# Patient Record
Sex: FEMALE | Race: WHITE | URBAN - METROPOLITAN AREA
[De-identification: names, ages, dates, MRNs, and addresses within clinical notes are randomized per-mention and may not be internally consistent; named-entity substitution may affect disease eponyms.]

---

## 2017-09-05 ENCOUNTER — EMERGENCY (EMERGENCY)
Facility: HOSPITAL | Age: 62
LOS: 1 days | Discharge: HOME | End: 2017-09-05
Attending: EMERGENCY MEDICINE | Admitting: EMERGENCY MEDICINE
Payer: MEDICAID

## 2017-09-05 VITALS
RESPIRATION RATE: 20 BRPM | HEART RATE: 60 BPM | OXYGEN SATURATION: 96 % | SYSTOLIC BLOOD PRESSURE: 170 MMHG | TEMPERATURE: 98 F | DIASTOLIC BLOOD PRESSURE: 86 MMHG

## 2017-09-05 VITALS
RESPIRATION RATE: 16 BRPM | HEART RATE: 95 BPM | OXYGEN SATURATION: 95 % | SYSTOLIC BLOOD PRESSURE: 141 MMHG | DIASTOLIC BLOOD PRESSURE: 84 MMHG

## 2017-09-05 LAB
BASOPHILS # BLD AUTO: 0.06 K/UL — SIGNIFICANT CHANGE UP (ref 0–0.2)
BASOPHILS NFR BLD AUTO: 0.6 % — SIGNIFICANT CHANGE UP (ref 0–2)
CK MB BLD-MCNC: 2.3 NG/ML — SIGNIFICANT CHANGE UP (ref 1–4.7)
CK SERPL-CCNC: 129 U/L — SIGNIFICANT CHANGE UP (ref 25–170)
EOSINOPHIL # BLD AUTO: 0.43 K/UL — SIGNIFICANT CHANGE UP (ref 0–0.5)
EOSINOPHIL NFR BLD AUTO: 4.6 % — SIGNIFICANT CHANGE UP (ref 0–6)
HCT VFR BLD CALC: 38.2 % — SIGNIFICANT CHANGE UP (ref 34.5–45)
HGB BLD-MCNC: 12 G/DL — SIGNIFICANT CHANGE UP (ref 11.5–15.5)
IMM GRANULOCYTES # BLD AUTO: 0.02 # — SIGNIFICANT CHANGE UP
IMM GRANULOCYTES NFR BLD AUTO: 0.2 % — SIGNIFICANT CHANGE UP (ref 0–1.5)
LYMPHOCYTES # BLD AUTO: 2.65 K/UL — SIGNIFICANT CHANGE UP (ref 1–3.3)
LYMPHOCYTES # BLD AUTO: 28.3 % — SIGNIFICANT CHANGE UP (ref 13–44)
MCHC RBC-ENTMCNC: 28.9 PG — SIGNIFICANT CHANGE UP (ref 27–34)
MCHC RBC-ENTMCNC: 31.4 % — LOW (ref 32–36)
MCV RBC AUTO: 92 FL — SIGNIFICANT CHANGE UP (ref 80–100)
MONOCYTES # BLD AUTO: 0.7 K/UL — SIGNIFICANT CHANGE UP (ref 0–0.9)
MONOCYTES NFR BLD AUTO: 7.5 % — SIGNIFICANT CHANGE UP (ref 2–14)
NEUTROPHILS # BLD AUTO: 5.52 K/UL — SIGNIFICANT CHANGE UP (ref 1.8–7.4)
NEUTROPHILS NFR BLD AUTO: 58.8 % — SIGNIFICANT CHANGE UP (ref 43–77)
NRBC # FLD: 0 — SIGNIFICANT CHANGE UP
PLATELET # BLD AUTO: 237 K/UL — SIGNIFICANT CHANGE UP (ref 150–400)
PMV BLD: 10.4 FL — SIGNIFICANT CHANGE UP (ref 7–13)
RBC # BLD: 4.15 M/UL — SIGNIFICANT CHANGE UP (ref 3.8–5.2)
RBC # FLD: 13.5 % — SIGNIFICANT CHANGE UP (ref 10.3–14.5)
TROPONIN T SERPL-MCNC: < 0.06 NG/ML — SIGNIFICANT CHANGE UP (ref 0–0.06)
WBC # BLD: 9.38 K/UL — SIGNIFICANT CHANGE UP (ref 3.8–10.5)
WBC # FLD AUTO: 9.38 K/UL — SIGNIFICANT CHANGE UP (ref 3.8–10.5)

## 2017-09-05 PROCEDURE — 99285 EMERGENCY DEPT VISIT HI MDM: CPT | Mod: 25

## 2017-09-05 PROCEDURE — 71010: CPT | Mod: 26

## 2017-09-05 PROCEDURE — 93010 ELECTROCARDIOGRAM REPORT: CPT

## 2017-09-05 RX ORDER — SODIUM CHLORIDE 9 MG/ML
3 INJECTION INTRAMUSCULAR; INTRAVENOUS; SUBCUTANEOUS ONCE
Qty: 0 | Refills: 0 | Status: COMPLETED | OUTPATIENT
Start: 2017-09-05 | End: 2017-09-05

## 2017-09-05 RX ADMIN — SODIUM CHLORIDE 3 MILLILITER(S): 9 INJECTION INTRAMUSCULAR; INTRAVENOUS; SUBCUTANEOUS at 04:52

## 2017-09-05 NOTE — ED PROVIDER NOTE - OBJECTIVE STATEMENT
63yo f pmh depression, HTN, COPD, reflux, p/w chest pain. Pain since 11pm. Mild associated SOB. No associated diaphoresis. Pain is sternal. Pt was given ASA by EMS + nitro w/o relief. Still with pain in ED. Mild cough at baseline w/o fevers or chills.    Per NH pt has had multiple ED visits for same at [facility] with negative w/u.

## 2017-09-05 NOTE — ED PROVIDER NOTE - ATTENDING CONTRIBUTION TO CARE
63 yo with CP and overall ill feeling.  CV and resp exam in the ED are unremarkable.  Discussed with the NH and they report that the patient has a history of pain and these type of episodes when she gets upset with staff or is not getting enough attention.  Has been worked up multiple times per report at [facility] with negative findings.  [facility]  Not consistent with an ACS.  EKG not concerning.  Will dc back to NH

## 2017-09-05 NOTE — ED ADULT TRIAGE NOTE - CHIEF COMPLAINT QUOTE
Patient c/o chest pain and pressure starting 11pm. Patient was given 162 ASA and 1NTG SL with no relief. Patient reports hx. COPD, Hyperlipidemia. Patient received with 22G IV Right hand by EMS.

## 2017-09-05 NOTE — ED ADULT NURSE NOTE - OBJECTIVE STATEMENT
pt. A&Ox3 c/o chest pain since 10 pm last night . Pt. states to have a hx. HLD and HTN , appears comfortable and in NAD. Pt. states she comes from a rehab center , because she is unable to take care of herself.  Pt. placed on cardiac monitor , NS , Pt. arrived with IV placed by ems , left hand 22 gauge. Labs drawn and sent. Will continue to monitor while in the ED.

## 2017-09-07 ENCOUNTER — INPATIENT (INPATIENT)
Facility: HOSPITAL | Age: 62
LOS: 4 days | Discharge: NURSING, REHAB OR LONG-TERM CARE | End: 2017-09-12
Attending: INTERNAL MEDICINE | Admitting: INTERNAL MEDICINE
Payer: MEDICAID

## 2017-09-07 VITALS
HEART RATE: 80 BPM | OXYGEN SATURATION: 100 % | DIASTOLIC BLOOD PRESSURE: 54 MMHG | TEMPERATURE: 98 F | SYSTOLIC BLOOD PRESSURE: 106 MMHG | RESPIRATION RATE: 20 BRPM

## 2017-09-07 LAB
ALBUMIN SERPL ELPH-MCNC: 4.4 G/DL — SIGNIFICANT CHANGE UP (ref 3.3–5)
ALP SERPL-CCNC: 117 U/L — SIGNIFICANT CHANGE UP (ref 40–120)
ALT FLD-CCNC: 17 U/L — SIGNIFICANT CHANGE UP (ref 4–33)
APTT BLD: 28.7 SEC — SIGNIFICANT CHANGE UP (ref 27.5–37.4)
AST SERPL-CCNC: 17 U/L — SIGNIFICANT CHANGE UP (ref 4–32)
BASOPHILS # BLD AUTO: 0.04 K/UL — SIGNIFICANT CHANGE UP (ref 0–0.2)
BASOPHILS NFR BLD AUTO: 0.5 % — SIGNIFICANT CHANGE UP (ref 0–2)
BILIRUB SERPL-MCNC: 0.3 MG/DL — SIGNIFICANT CHANGE UP (ref 0.2–1.2)
BUN SERPL-MCNC: 16 MG/DL — SIGNIFICANT CHANGE UP (ref 7–23)
CALCIUM SERPL-MCNC: 9.8 MG/DL — SIGNIFICANT CHANGE UP (ref 8.4–10.5)
CHLORIDE SERPL-SCNC: 106 MMOL/L — SIGNIFICANT CHANGE UP (ref 98–107)
CK MB BLD-MCNC: 2.37 NG/ML — SIGNIFICANT CHANGE UP (ref 1–4.7)
CK MB BLD-MCNC: SIGNIFICANT CHANGE UP (ref 0–2.5)
CK SERPL-CCNC: 104 U/L — SIGNIFICANT CHANGE UP (ref 25–170)
CO2 SERPL-SCNC: 25 MMOL/L — SIGNIFICANT CHANGE UP (ref 22–31)
CREAT SERPL-MCNC: 1.01 MG/DL — SIGNIFICANT CHANGE UP (ref 0.5–1.3)
EOSINOPHIL # BLD AUTO: 0.28 K/UL — SIGNIFICANT CHANGE UP (ref 0–0.5)
EOSINOPHIL NFR BLD AUTO: 3.2 % — SIGNIFICANT CHANGE UP (ref 0–6)
GLUCOSE SERPL-MCNC: 95 MG/DL — SIGNIFICANT CHANGE UP (ref 70–99)
HCT VFR BLD CALC: 41.8 % — SIGNIFICANT CHANGE UP (ref 34.5–45)
HGB BLD-MCNC: 13.2 G/DL — SIGNIFICANT CHANGE UP (ref 11.5–15.5)
IMM GRANULOCYTES # BLD AUTO: 0.02 # — SIGNIFICANT CHANGE UP
IMM GRANULOCYTES NFR BLD AUTO: 0.2 % — SIGNIFICANT CHANGE UP (ref 0–1.5)
INR BLD: 0.93 — SIGNIFICANT CHANGE UP (ref 0.88–1.17)
LYMPHOCYTES # BLD AUTO: 2.06 K/UL — SIGNIFICANT CHANGE UP (ref 1–3.3)
LYMPHOCYTES # BLD AUTO: 23.8 % — SIGNIFICANT CHANGE UP (ref 13–44)
MCHC RBC-ENTMCNC: 29.3 PG — SIGNIFICANT CHANGE UP (ref 27–34)
MCHC RBC-ENTMCNC: 31.6 % — LOW (ref 32–36)
MCV RBC AUTO: 92.7 FL — SIGNIFICANT CHANGE UP (ref 80–100)
MONOCYTES # BLD AUTO: 0.54 K/UL — SIGNIFICANT CHANGE UP (ref 0–0.9)
MONOCYTES NFR BLD AUTO: 6.3 % — SIGNIFICANT CHANGE UP (ref 2–14)
NEUTROPHILS # BLD AUTO: 5.7 K/UL — SIGNIFICANT CHANGE UP (ref 1.8–7.4)
NEUTROPHILS NFR BLD AUTO: 66 % — SIGNIFICANT CHANGE UP (ref 43–77)
NRBC # FLD: 0 — SIGNIFICANT CHANGE UP
PLATELET # BLD AUTO: 272 K/UL — SIGNIFICANT CHANGE UP (ref 150–400)
PMV BLD: 10.5 FL — SIGNIFICANT CHANGE UP (ref 7–13)
POTASSIUM SERPL-MCNC: 3.6 MMOL/L — SIGNIFICANT CHANGE UP (ref 3.5–5.3)
POTASSIUM SERPL-SCNC: 3.6 MMOL/L — SIGNIFICANT CHANGE UP (ref 3.5–5.3)
PROT SERPL-MCNC: 7.2 G/DL — SIGNIFICANT CHANGE UP (ref 6–8.3)
PROTHROM AB SERPL-ACNC: 10.4 SEC — SIGNIFICANT CHANGE UP (ref 9.8–13.1)
RBC # BLD: 4.51 M/UL — SIGNIFICANT CHANGE UP (ref 3.8–5.2)
RBC # FLD: 13.2 % — SIGNIFICANT CHANGE UP (ref 10.3–14.5)
SODIUM SERPL-SCNC: 144 MMOL/L — SIGNIFICANT CHANGE UP (ref 135–145)
TROPONIN T SERPL-MCNC: < 0.06 NG/ML — SIGNIFICANT CHANGE UP (ref 0–0.06)
WBC # BLD: 8.64 K/UL — SIGNIFICANT CHANGE UP (ref 3.8–10.5)
WBC # FLD AUTO: 8.64 K/UL — SIGNIFICANT CHANGE UP (ref 3.8–10.5)

## 2017-09-07 RX ORDER — ASPIRIN/CALCIUM CARB/MAGNESIUM 324 MG
162 TABLET ORAL ONCE
Qty: 0 | Refills: 0 | Status: COMPLETED | OUTPATIENT
Start: 2017-09-07 | End: 2017-09-07

## 2017-09-07 RX ADMIN — Medication 162 MILLIGRAM(S): at 23:09

## 2017-09-07 NOTE — ED ADULT TRIAGE NOTE - CHIEF COMPLAINT QUOTE
Pt from [facility] home states developed chest pain with sob  around 6pm.  Pt states was seen here on tuesday for same thing but feels worse today. Pt was given 162mg asa

## 2017-09-07 NOTE — ED ADULT NURSE NOTE - OBJECTIVE STATEMENT
michelle rn: pt received to room #11 wit c/o of midsternal cp. arrives with stuffed animal. pain as noted. pt appears comfortable, speaking without distress. states "I want someone to go into my heart with a scope and see what's wrong". on HM, NSR. denies sob, n/v/d and urinary symptoms. IV placed, labs drawn and sent. pending MD murray. report given to primary RN.

## 2017-09-08 DIAGNOSIS — Z29.9 ENCOUNTER FOR PROPHYLACTIC MEASURES, UNSPECIFIED: ICD-10-CM

## 2017-09-08 DIAGNOSIS — R07.9 CHEST PAIN, UNSPECIFIED: ICD-10-CM

## 2017-09-08 DIAGNOSIS — F41.9 ANXIETY DISORDER, UNSPECIFIED: ICD-10-CM

## 2017-09-08 DIAGNOSIS — F32.9 MAJOR DEPRESSIVE DISORDER, SINGLE EPISODE, UNSPECIFIED: ICD-10-CM

## 2017-09-08 DIAGNOSIS — I10 ESSENTIAL (PRIMARY) HYPERTENSION: ICD-10-CM

## 2017-09-08 DIAGNOSIS — K21.9 GASTRO-ESOPHAGEAL REFLUX DISEASE WITHOUT ESOPHAGITIS: ICD-10-CM

## 2017-09-08 LAB
BUN SERPL-MCNC: 15 MG/DL — SIGNIFICANT CHANGE UP (ref 7–23)
CALCIUM SERPL-MCNC: 9.4 MG/DL — SIGNIFICANT CHANGE UP (ref 8.4–10.5)
CHLORIDE SERPL-SCNC: 108 MMOL/L — HIGH (ref 98–107)
CHOLEST SERPL-MCNC: 238 MG/DL — HIGH (ref 120–199)
CK MB BLD-MCNC: 1.83 NG/ML — SIGNIFICANT CHANGE UP (ref 1–4.7)
CK SERPL-CCNC: 77 U/L — SIGNIFICANT CHANGE UP (ref 25–170)
CO2 SERPL-SCNC: 22 MMOL/L — SIGNIFICANT CHANGE UP (ref 22–31)
CREAT SERPL-MCNC: 0.95 MG/DL — SIGNIFICANT CHANGE UP (ref 0.5–1.3)
GLUCOSE SERPL-MCNC: 93 MG/DL — SIGNIFICANT CHANGE UP (ref 70–99)
HBA1C BLD-MCNC: 6.4 % — HIGH (ref 4–5.6)
HCT VFR BLD CALC: 37.1 % — SIGNIFICANT CHANGE UP (ref 34.5–45)
HDLC SERPL-MCNC: 46 MG/DL — SIGNIFICANT CHANGE UP (ref 45–65)
HGB BLD-MCNC: 11.7 G/DL — SIGNIFICANT CHANGE UP (ref 11.5–15.5)
LIPID PNL WITH DIRECT LDL SERPL: 176 MG/DL — SIGNIFICANT CHANGE UP
MAGNESIUM SERPL-MCNC: 1.9 MG/DL — SIGNIFICANT CHANGE UP (ref 1.6–2.6)
MCHC RBC-ENTMCNC: 28.7 PG — SIGNIFICANT CHANGE UP (ref 27–34)
MCHC RBC-ENTMCNC: 31.5 % — LOW (ref 32–36)
MCV RBC AUTO: 90.9 FL — SIGNIFICANT CHANGE UP (ref 80–100)
NRBC # FLD: 0 — SIGNIFICANT CHANGE UP
PHOSPHATE SERPL-MCNC: 3.1 MG/DL — SIGNIFICANT CHANGE UP (ref 2.5–4.5)
PLATELET # BLD AUTO: 214 K/UL — SIGNIFICANT CHANGE UP (ref 150–400)
PMV BLD: 10.1 FL — SIGNIFICANT CHANGE UP (ref 7–13)
POTASSIUM SERPL-MCNC: 3.5 MMOL/L — SIGNIFICANT CHANGE UP (ref 3.5–5.3)
POTASSIUM SERPL-SCNC: 3.5 MMOL/L — SIGNIFICANT CHANGE UP (ref 3.5–5.3)
RBC # BLD: 4.08 M/UL — SIGNIFICANT CHANGE UP (ref 3.8–5.2)
RBC # FLD: 13.2 % — SIGNIFICANT CHANGE UP (ref 10.3–14.5)
SODIUM SERPL-SCNC: 144 MMOL/L — SIGNIFICANT CHANGE UP (ref 135–145)
TRIGL SERPL-MCNC: 200 MG/DL — HIGH (ref 10–149)
TROPONIN T SERPL-MCNC: < 0.06 NG/ML — SIGNIFICANT CHANGE UP (ref 0–0.06)
TROPONIN T SERPL-MCNC: < 0.06 NG/ML — SIGNIFICANT CHANGE UP (ref 0–0.06)
TSH SERPL-MCNC: 3.9 UIU/ML — SIGNIFICANT CHANGE UP (ref 0.27–4.2)
WBC # BLD: 8.79 K/UL — SIGNIFICANT CHANGE UP (ref 3.8–10.5)
WBC # FLD AUTO: 8.79 K/UL — SIGNIFICANT CHANGE UP (ref 3.8–10.5)

## 2017-09-08 PROCEDURE — 71010: CPT | Mod: 26

## 2017-09-08 PROCEDURE — 93567 NJX CAR CTH SPRVLV AORTGRPHY: CPT

## 2017-09-08 PROCEDURE — 99152 MOD SED SAME PHYS/QHP 5/>YRS: CPT

## 2017-09-08 PROCEDURE — 93306 TTE W/DOPPLER COMPLETE: CPT | Mod: 26

## 2017-09-08 PROCEDURE — 93458 L HRT ARTERY/VENTRICLE ANGIO: CPT | Mod: 26

## 2017-09-08 RX ORDER — SENNA PLUS 8.6 MG/1
1 TABLET ORAL AT BEDTIME
Qty: 0 | Refills: 0 | Status: DISCONTINUED | OUTPATIENT
Start: 2017-09-08 | End: 2017-09-12

## 2017-09-08 RX ORDER — HYDRALAZINE HCL 50 MG
5 TABLET ORAL ONCE
Qty: 0 | Refills: 0 | Status: COMPLETED | OUTPATIENT
Start: 2017-09-08 | End: 2017-09-08

## 2017-09-08 RX ORDER — THIAMINE MONONITRATE (VIT B1) 100 MG
100 TABLET ORAL DAILY
Qty: 0 | Refills: 0 | Status: DISCONTINUED | OUTPATIENT
Start: 2017-09-08 | End: 2017-09-12

## 2017-09-08 RX ORDER — PANTOPRAZOLE SODIUM 20 MG/1
40 TABLET, DELAYED RELEASE ORAL
Qty: 0 | Refills: 0 | Status: DISCONTINUED | OUTPATIENT
Start: 2017-09-08 | End: 2017-09-12

## 2017-09-08 RX ORDER — SOD,AMMONIUM,POTASSIUM LACTATE
1 CREAM (GRAM) TOPICAL
Qty: 0 | Refills: 0 | Status: DISCONTINUED | OUTPATIENT
Start: 2017-09-08 | End: 2017-09-08

## 2017-09-08 RX ORDER — METHOCARBAMOL 500 MG/1
500 TABLET, FILM COATED ORAL
Qty: 0 | Refills: 0 | Status: DISCONTINUED | OUTPATIENT
Start: 2017-09-08 | End: 2017-09-12

## 2017-09-08 RX ORDER — SUCRALFATE 1 G
1 TABLET ORAL
Qty: 0 | Refills: 0 | Status: DISCONTINUED | OUTPATIENT
Start: 2017-09-08 | End: 2017-09-12

## 2017-09-08 RX ORDER — HYDRALAZINE HCL 50 MG
10 TABLET ORAL ONCE
Qty: 0 | Refills: 0 | Status: COMPLETED | OUTPATIENT
Start: 2017-09-08 | End: 2017-09-08

## 2017-09-08 RX ORDER — HYDRALAZINE HCL 50 MG
5 TABLET ORAL ONCE
Qty: 0 | Refills: 0 | Status: DISCONTINUED | OUTPATIENT
Start: 2017-09-08 | End: 2017-09-08

## 2017-09-08 RX ORDER — TRAMADOL HYDROCHLORIDE 50 MG/1
50 TABLET ORAL EVERY 4 HOURS
Qty: 0 | Refills: 0 | Status: DISCONTINUED | OUTPATIENT
Start: 2017-09-08 | End: 2017-09-12

## 2017-09-08 RX ORDER — TRAZODONE HCL 50 MG
300 TABLET ORAL AT BEDTIME
Qty: 0 | Refills: 0 | Status: DISCONTINUED | OUTPATIENT
Start: 2017-09-08 | End: 2017-09-12

## 2017-09-08 RX ORDER — LACTULOSE 10 G/15ML
20 SOLUTION ORAL EVERY 8 HOURS
Qty: 0 | Refills: 0 | Status: DISCONTINUED | OUTPATIENT
Start: 2017-09-08 | End: 2017-09-12

## 2017-09-08 RX ORDER — CLOTRIMAZOLE AND BETAMETHASONE DIPROPIONATE 10; .5 MG/G; MG/G
1 CREAM TOPICAL
Qty: 0 | Refills: 0 | Status: DISCONTINUED | OUTPATIENT
Start: 2017-09-08 | End: 2017-09-08

## 2017-09-08 RX ORDER — ACETAMINOPHEN 500 MG
1000 TABLET ORAL ONCE
Qty: 0 | Refills: 0 | Status: DISCONTINUED | OUTPATIENT
Start: 2017-09-08 | End: 2017-09-08

## 2017-09-08 RX ORDER — LANOLIN ALCOHOL/MO/W.PET/CERES
3 CREAM (GRAM) TOPICAL AT BEDTIME
Qty: 0 | Refills: 0 | Status: DISCONTINUED | OUTPATIENT
Start: 2017-09-08 | End: 2017-09-12

## 2017-09-08 RX ORDER — FAMOTIDINE 10 MG/ML
20 INJECTION INTRAVENOUS
Qty: 0 | Refills: 0 | Status: DISCONTINUED | OUTPATIENT
Start: 2017-09-08 | End: 2017-09-12

## 2017-09-08 RX ORDER — MORPHINE SULFATE 50 MG/1
2 CAPSULE, EXTENDED RELEASE ORAL ONCE
Qty: 0 | Refills: 0 | Status: DISCONTINUED | OUTPATIENT
Start: 2017-09-08 | End: 2017-09-08

## 2017-09-08 RX ORDER — BUDESONIDE AND FORMOTEROL FUMARATE DIHYDRATE 160; 4.5 UG/1; UG/1
2 AEROSOL RESPIRATORY (INHALATION)
Qty: 0 | Refills: 0 | Status: DISCONTINUED | OUTPATIENT
Start: 2017-09-08 | End: 2017-09-12

## 2017-09-08 RX ORDER — ACETAMINOPHEN 500 MG
650 TABLET ORAL EVERY 6 HOURS
Qty: 0 | Refills: 0 | Status: DISCONTINUED | OUTPATIENT
Start: 2017-09-08 | End: 2017-09-12

## 2017-09-08 RX ORDER — SOD,AMMONIUM,POTASSIUM LACTATE
1 CREAM (GRAM) TOPICAL DAILY
Qty: 0 | Refills: 0 | Status: DISCONTINUED | OUTPATIENT
Start: 2017-09-08 | End: 2017-09-12

## 2017-09-08 RX ORDER — CITALOPRAM 10 MG/1
20 TABLET, FILM COATED ORAL DAILY
Qty: 0 | Refills: 0 | Status: DISCONTINUED | OUTPATIENT
Start: 2017-09-08 | End: 2017-09-12

## 2017-09-08 RX ORDER — ACETAMINOPHEN 500 MG
650 TABLET ORAL ONCE
Qty: 0 | Refills: 0 | Status: COMPLETED | OUTPATIENT
Start: 2017-09-08 | End: 2017-09-08

## 2017-09-08 RX ORDER — SIMVASTATIN 20 MG/1
40 TABLET, FILM COATED ORAL AT BEDTIME
Qty: 0 | Refills: 0 | Status: DISCONTINUED | OUTPATIENT
Start: 2017-09-08 | End: 2017-09-12

## 2017-09-08 RX ORDER — HEPARIN SODIUM 5000 [USP'U]/ML
5000 INJECTION INTRAVENOUS; SUBCUTANEOUS EVERY 12 HOURS
Qty: 0 | Refills: 0 | Status: DISCONTINUED | OUTPATIENT
Start: 2017-09-08 | End: 2017-09-12

## 2017-09-08 RX ORDER — ARIPIPRAZOLE 15 MG/1
2 TABLET ORAL DAILY
Qty: 0 | Refills: 0 | Status: DISCONTINUED | OUTPATIENT
Start: 2017-09-08 | End: 2017-09-12

## 2017-09-08 RX ORDER — TOPIRAMATE 25 MG
50 TABLET ORAL AT BEDTIME
Qty: 0 | Refills: 0 | Status: DISCONTINUED | OUTPATIENT
Start: 2017-09-08 | End: 2017-09-12

## 2017-09-08 RX ADMIN — Medication 300 MILLIGRAM(S): at 21:56

## 2017-09-08 RX ADMIN — PANTOPRAZOLE SODIUM 40 MILLIGRAM(S): 20 TABLET, DELAYED RELEASE ORAL at 21:56

## 2017-09-08 RX ADMIN — METHOCARBAMOL 500 MILLIGRAM(S): 500 TABLET, FILM COATED ORAL at 15:33

## 2017-09-08 RX ADMIN — Medication 1 TABLET(S): at 15:33

## 2017-09-08 RX ADMIN — MORPHINE SULFATE 2 MILLIGRAM(S): 50 CAPSULE, EXTENDED RELEASE ORAL at 04:40

## 2017-09-08 RX ADMIN — MORPHINE SULFATE 2 MILLIGRAM(S): 50 CAPSULE, EXTENDED RELEASE ORAL at 05:01

## 2017-09-08 RX ADMIN — METHOCARBAMOL 500 MILLIGRAM(S): 500 TABLET, FILM COATED ORAL at 06:38

## 2017-09-08 RX ADMIN — Medication 650 MILLIGRAM(S): at 00:54

## 2017-09-08 RX ADMIN — Medication 1 DROP(S): at 15:33

## 2017-09-08 RX ADMIN — Medication 50 MILLIGRAM(S): at 21:55

## 2017-09-08 RX ADMIN — HEPARIN SODIUM 5000 UNIT(S): 5000 INJECTION INTRAVENOUS; SUBCUTANEOUS at 21:56

## 2017-09-08 RX ADMIN — Medication 1 GRAM(S): at 21:59

## 2017-09-08 RX ADMIN — Medication 5 MILLIGRAM(S): at 00:43

## 2017-09-08 RX ADMIN — Medication 1 DROP(S): at 21:55

## 2017-09-08 RX ADMIN — BUDESONIDE AND FORMOTEROL FUMARATE DIHYDRATE 2 PUFF(S): 160; 4.5 AEROSOL RESPIRATORY (INHALATION) at 21:59

## 2017-09-08 RX ADMIN — SIMVASTATIN 40 MILLIGRAM(S): 20 TABLET, FILM COATED ORAL at 21:56

## 2017-09-08 RX ADMIN — Medication 1 GRAM(S): at 06:38

## 2017-09-08 RX ADMIN — HEPARIN SODIUM 5000 UNIT(S): 5000 INJECTION INTRAVENOUS; SUBCUTANEOUS at 06:38

## 2017-09-08 RX ADMIN — SENNA PLUS 1 TABLET(S): 8.6 TABLET ORAL at 21:56

## 2017-09-08 RX ADMIN — FAMOTIDINE 20 MILLIGRAM(S): 10 INJECTION INTRAVENOUS at 21:56

## 2017-09-08 RX ADMIN — Medication 10 MILLIGRAM(S): at 19:43

## 2017-09-08 RX ADMIN — CITALOPRAM 20 MILLIGRAM(S): 10 TABLET, FILM COATED ORAL at 15:33

## 2017-09-08 RX ADMIN — TRAMADOL HYDROCHLORIDE 50 MILLIGRAM(S): 50 TABLET ORAL at 21:56

## 2017-09-08 RX ADMIN — Medication 5 MILLIGRAM(S): at 04:40

## 2017-09-08 RX ADMIN — METHOCARBAMOL 500 MILLIGRAM(S): 500 TABLET, FILM COATED ORAL at 21:55

## 2017-09-08 RX ADMIN — ARIPIPRAZOLE 2 MILLIGRAM(S): 15 TABLET ORAL at 21:56

## 2017-09-08 RX ADMIN — Medication 1 DROP(S): at 06:37

## 2017-09-08 RX ADMIN — Medication 5 MILLIGRAM(S): at 06:37

## 2017-09-08 RX ADMIN — Medication 100 MILLIGRAM(S): at 15:33

## 2017-09-08 RX ADMIN — FAMOTIDINE 20 MILLIGRAM(S): 10 INJECTION INTRAVENOUS at 06:37

## 2017-09-08 NOTE — H&P ADULT - NSHPREVIEWOFSYSTEMS_GEN_ALL_CORE
Constitutional: No fever, fatigue or weight loss.  Skin: No rash.  Eyes: No recent vision problems or eye pain.  ENT: No congestion, ear pain, or sore throat.  Endocrine: No thyroid problems.  Cardiovascular: + chest pain. No palpitations.   Respiratory: + shortness of breath. No cough, congestion, or wheezing.  Gastrointestinal: No abdominal pain, nausea, vomiting, or diarrhea.  Genitourinary: No dysuria.  Musculoskeletal: No joint swelling.  Neurologic: No headache.

## 2017-09-08 NOTE — ED PROVIDER NOTE - PMH
Chronic obstructive pulmonary disease, unspecified COPD type    Essential hypertension <<----- Click to add NO pertinent Past Medical History

## 2017-09-08 NOTE — ED PROVIDER NOTE - ATTENDING CONTRIBUTION TO CARE
BERLIN Hatfield: I have personally performed a face to face bedside history and physical examination of this patient. I have discussed the history, examination, review of systems, assessment and plan of management with the resident. I have reviewed the electronic medical record and amended it to reflect my history, review of systems, physical exam, assessment and plan.  62f from nh presents for cp. Started earlier today, center of her chest, radiating to her neck, pounding, now resolved. Has had this pain intermittently for long time, previously associated with anxiety but recently feels its been different. Seen here tuesday for same pain. Has mild assoc sob, none now, no fevers, ha, abd pain, vomiting, diarrhea, dysuria. Has chronic b/l le swelling.  Has never had full cardiac w/u, has multiple family members with cardiac dx.  exam  GEN - NAD; A+O x3   HEAD - NC/AT   EYES- PERRL, EOMI  ENT: Airway patent, mmm, Oral cavity and pharynx normal. No inflammation, swelling, exudate, or lesions.  NECK: Neck supple, non-tender without lymphadenopathy, no masses.  PULMONARY - CTA b/l, symmetric breath sounds.   CARDIAC -s1s2, RRR, no M,G,R  ABDOMEN - +BS, ND, NT, soft, no guarding, no rebound, no masses   BACK - no CVA tenderness, Normal  spine   EXTREMITIES - symmetric pulses, capillary refill < 2 seconds, no clubbing, no cyanosis, 1+b/l le edema  SKIN - no rash or bruising   NEUROLOGIC - alert, CN 2-12 intact, nl strength/sensation, no focal deficits  PSYCH -nl mood/affect, no apparent risk to self or others  a/p-intermittent cp, no h/o cardiac w/u, asymptomatic now, nonfocal exam, nontoxic appearing, labs, cxr, ekg, admit for cardiac w/u.

## 2017-09-08 NOTE — ED PROVIDER NOTE - OBJECTIVE STATEMENT
61 [facility] F from [facility] PMH CHF/HTN/COPD/GERD p/w diffuse upper chest pain for several hours. The pain is described as pounding and radiates into her back, associated w/ mild dyspnea. She had a similar episode of pain on Tuesday for which she was seen in this ED, w/ negative [facility] home. That pain improved, then returned today. She has had similar episodes of pain intermittently for years, but today's episode is more severe than usual. Past episodes have been associated w/ anxiety. She denies palpitations/HA/light headedness/dizziness/abd pain/n/v/d. +Chronic BLE edema, not worse than usual.

## 2017-09-08 NOTE — H&P ADULT - NSHPPHYSICALEXAM_GEN_ALL_CORE
GENERAL APPEARANCE: Well developed, well nourished, alert and cooperative, and appears to be in no acute distress.  HEAD: normocephalic.  EYES: PERRL, EOMI.   EARS: External auditory canals clear, hearing grossly intact.  NECK: Neck supple, non-tender without lymphadenopathy, masses or thyromegaly.  CARDIAC: Normal S1 and S2. No S3, S4 or murmurs. Rhythm is regular. Tenderness to palpitation of the chest.   LUNGS: Clear to auscultation and percussion without rales, rhonchi, wheezing or diminished breath sounds.  ABDOMEN: Positive bowel sounds. Soft, nondistended, nontender. No guarding or rebound. No masses.  EXTREMITIES: No significant deformity or joint abnormality. + nonpitting edema, erythematous, bilaterally.  Peripheral pulses intact.   NEUROLOGICAL: Strength and sensation symmetric and intact throughout.   PSYCHIATRIC: The mental examination revealed the patient was oriented to person, place, and time. The patient was able to demonstrate good judgement and reason, without hallucinations, abnormal affect or abnormal behaviors during the examination. Patient is not suicidal.

## 2017-09-08 NOTE — ED PROVIDER NOTE - MEDICAL DECISION MAKING DETAILS
Recurrent cp after recent evaluation 2 days ago, with pain today that is apparently worse than before - possible she has had similar episodes, but no record of cardiac eval. Will admit to tele for cardiac r/o.

## 2017-09-08 NOTE — H&P ADULT - ATTENDING COMMENTS
pt seen and examined  agree with above pa note     patient is a 62 year old woman with history of HTN, COPD, anxiety, GERD, depression admitted with recurrent chest pain.  sx associated with shortness of breath.     reports multiple recurrent episodes over the past week.  + fam hx of cad/scd  Pt denies LOC, syncope,fever, chills, numbness, tingling, palpitations, headache, visual/hearing changes, N/V/D/C, dysuria, urinary/bowel incontinence or any other complaints.  unable to tolerate stress testing due to inability to raise arms    ecg sr no acute changes  cxr clear  nino negative    vitals stable  nad aao x 3 nc/at neck supple no jvd  cv s1s2 rrr  lungs clear b/l  abd soft, nt  ext b/l edema 1+    A/P    recurrent chest pain  unable to perform stress test  still with recurrent symptoms with cad riskfactors  will proceed with cardiac .cath to r/o obs cad  Risks/benefits/alternatives of cardiac catheterization discussed with patient at length.  All questions and concerns were addressed. Patient agrees to proceed.   asa  d/c plan pending Risks/benefits/alternatives of cardiac catheterization discussed with patient at length.  All questions and concerns were addressed. Patient agrees to proceed.

## 2017-09-08 NOTE — H&P ADULT - FAMILY HISTORY
Father  Still living? Unknown  Family history of acute myocardial infarction, Age at diagnosis: Age Unknown     Mother  Still living? Unknown  Family history of CHF (congestive heart failure), Age at diagnosis: Age Unknown

## 2017-09-08 NOTE — ED ADULT NURSE REASSESSMENT NOTE - NS ED NURSE REASSESS COMMENT FT1
Patient changed, skin intact, still complains of dizziness and HA. Awaiting bed assignment upstairs. VSS. Safety maintained, needs attended. Will continue to monitor.

## 2017-09-08 NOTE — H&P ADULT - PROBLEM SELECTOR PLAN 1
Admit to telemetry.   Trend CE. NST vs. Cardiac cath.   TTE ordered Serial EKG prn for chest pain.   check cbc,tsh,lipid, hemoglobin a1c, bmp with mag and phos.   f/u MD note

## 2017-09-08 NOTE — H&P ADULT - ASSESSMENT
61 y/o F with h/o HTN, COPD, anxiety, GERD, depression presents to the ED for chest pain. Admit to telemetry to r/o ACS.

## 2017-09-08 NOTE — H&P ADULT - HISTORY OF PRESENT ILLNESS
63 y/o F with h/o HTN, COPD, anxiety, GERD, depression presents to the ED for chest pain. Pt states the chest pain started around 6 pm yesterday. Pt states the pain is constant, radiates to the back and left neck. Pt states the pain is reproducible. Pt states it is nonpleuritic and unchanged in certain positions. Pt also states it is associated with mild shortness of breath. Pt states she had a similar episode on Tuesday which she came to the ED for, however she was discharged. Pt states the pain is worse today. Pt states she has had episode previously before, usually associated with anxiety. Pt reports she never has had a cardiac workup. Pt reports she has a strong family history of heart disease, mother  of CHF and father  of MI. Pt denies LOC, syncope,fever, chills, numbness, tingling, palpitations, headache, visual/hearing changes, N/V/D/C, dysuria, urinary/bowel incontinence or any other complaints.

## 2017-09-09 LAB
BUN SERPL-MCNC: 24 MG/DL — HIGH (ref 7–23)
CALCIUM SERPL-MCNC: 9.2 MG/DL — SIGNIFICANT CHANGE UP (ref 8.4–10.5)
CHLORIDE SERPL-SCNC: 108 MMOL/L — HIGH (ref 98–107)
CO2 SERPL-SCNC: 22 MMOL/L — SIGNIFICANT CHANGE UP (ref 22–31)
CREAT SERPL-MCNC: 1.08 MG/DL — SIGNIFICANT CHANGE UP (ref 0.5–1.3)
GLUCOSE SERPL-MCNC: 92 MG/DL — SIGNIFICANT CHANGE UP (ref 70–99)
HCT VFR BLD CALC: 36.3 % — SIGNIFICANT CHANGE UP (ref 34.5–45)
HGB BLD-MCNC: 11.4 G/DL — LOW (ref 11.5–15.5)
MAGNESIUM SERPL-MCNC: 2 MG/DL — SIGNIFICANT CHANGE UP (ref 1.6–2.6)
MCHC RBC-ENTMCNC: 28.9 PG — SIGNIFICANT CHANGE UP (ref 27–34)
MCHC RBC-ENTMCNC: 31.4 % — LOW (ref 32–36)
MCV RBC AUTO: 92.1 FL — SIGNIFICANT CHANGE UP (ref 80–100)
NRBC # FLD: 0 — SIGNIFICANT CHANGE UP
PLATELET # BLD AUTO: 217 K/UL — SIGNIFICANT CHANGE UP (ref 150–400)
PMV BLD: 10.4 FL — SIGNIFICANT CHANGE UP (ref 7–13)
POTASSIUM SERPL-MCNC: 3.5 MMOL/L — SIGNIFICANT CHANGE UP (ref 3.5–5.3)
POTASSIUM SERPL-SCNC: 3.5 MMOL/L — SIGNIFICANT CHANGE UP (ref 3.5–5.3)
RBC # BLD: 3.94 M/UL — SIGNIFICANT CHANGE UP (ref 3.8–5.2)
RBC # FLD: 13.5 % — SIGNIFICANT CHANGE UP (ref 10.3–14.5)
SODIUM SERPL-SCNC: 145 MMOL/L — SIGNIFICANT CHANGE UP (ref 135–145)
WBC # BLD: 9.08 K/UL — SIGNIFICANT CHANGE UP (ref 3.8–10.5)
WBC # FLD AUTO: 9.08 K/UL — SIGNIFICANT CHANGE UP (ref 3.8–10.5)

## 2017-09-09 RX ORDER — POTASSIUM CHLORIDE 20 MEQ
20 PACKET (EA) ORAL ONCE
Qty: 0 | Refills: 0 | Status: COMPLETED | OUTPATIENT
Start: 2017-09-09 | End: 2017-09-09

## 2017-09-09 RX ORDER — MORPHINE SULFATE 50 MG/1
0.5 CAPSULE, EXTENDED RELEASE ORAL ONCE
Qty: 0 | Refills: 0 | Status: DISCONTINUED | OUTPATIENT
Start: 2017-09-09 | End: 2017-09-09

## 2017-09-09 RX ADMIN — Medication 300 MILLIGRAM(S): at 21:09

## 2017-09-09 RX ADMIN — TRAMADOL HYDROCHLORIDE 50 MILLIGRAM(S): 50 TABLET ORAL at 20:45

## 2017-09-09 RX ADMIN — Medication 1 TABLET(S): at 17:47

## 2017-09-09 RX ADMIN — SIMVASTATIN 40 MILLIGRAM(S): 20 TABLET, FILM COATED ORAL at 21:07

## 2017-09-09 RX ADMIN — LACTULOSE 20 GRAM(S): 10 SOLUTION ORAL at 19:58

## 2017-09-09 RX ADMIN — BUDESONIDE AND FORMOTEROL FUMARATE DIHYDRATE 2 PUFF(S): 160; 4.5 AEROSOL RESPIRATORY (INHALATION) at 21:08

## 2017-09-09 RX ADMIN — TRAMADOL HYDROCHLORIDE 50 MILLIGRAM(S): 50 TABLET ORAL at 19:58

## 2017-09-09 RX ADMIN — FAMOTIDINE 20 MILLIGRAM(S): 10 INJECTION INTRAVENOUS at 06:00

## 2017-09-09 RX ADMIN — HEPARIN SODIUM 5000 UNIT(S): 5000 INJECTION INTRAVENOUS; SUBCUTANEOUS at 17:48

## 2017-09-09 RX ADMIN — Medication 100 MILLIGRAM(S): at 17:46

## 2017-09-09 RX ADMIN — Medication 5 MILLIGRAM(S): at 06:00

## 2017-09-09 RX ADMIN — FAMOTIDINE 20 MILLIGRAM(S): 10 INJECTION INTRAVENOUS at 18:40

## 2017-09-09 RX ADMIN — HEPARIN SODIUM 5000 UNIT(S): 5000 INJECTION INTRAVENOUS; SUBCUTANEOUS at 06:00

## 2017-09-09 RX ADMIN — PANTOPRAZOLE SODIUM 40 MILLIGRAM(S): 20 TABLET, DELAYED RELEASE ORAL at 06:00

## 2017-09-09 RX ADMIN — Medication 1 DROP(S): at 23:47

## 2017-09-09 RX ADMIN — ARIPIPRAZOLE 2 MILLIGRAM(S): 15 TABLET ORAL at 17:48

## 2017-09-09 RX ADMIN — SENNA PLUS 1 TABLET(S): 8.6 TABLET ORAL at 21:06

## 2017-09-09 RX ADMIN — METHOCARBAMOL 500 MILLIGRAM(S): 500 TABLET, FILM COATED ORAL at 17:46

## 2017-09-09 RX ADMIN — TRAMADOL HYDROCHLORIDE 50 MILLIGRAM(S): 50 TABLET ORAL at 11:12

## 2017-09-09 RX ADMIN — MORPHINE SULFATE 0.5 MILLIGRAM(S): 50 CAPSULE, EXTENDED RELEASE ORAL at 23:45

## 2017-09-09 RX ADMIN — METHOCARBAMOL 500 MILLIGRAM(S): 500 TABLET, FILM COATED ORAL at 06:00

## 2017-09-09 RX ADMIN — TRAMADOL HYDROCHLORIDE 50 MILLIGRAM(S): 50 TABLET ORAL at 17:49

## 2017-09-09 RX ADMIN — TRAMADOL HYDROCHLORIDE 50 MILLIGRAM(S): 50 TABLET ORAL at 13:04

## 2017-09-09 RX ADMIN — Medication 20 MILLIEQUIVALENT(S): at 17:46

## 2017-09-09 RX ADMIN — Medication 50 MILLIGRAM(S): at 21:08

## 2017-09-09 RX ADMIN — Medication 3 MILLIGRAM(S): at 23:46

## 2017-09-09 RX ADMIN — CITALOPRAM 20 MILLIGRAM(S): 10 TABLET, FILM COATED ORAL at 18:40

## 2017-09-09 RX ADMIN — Medication 1 DROP(S): at 06:00

## 2017-09-09 RX ADMIN — BUDESONIDE AND FORMOTEROL FUMARATE DIHYDRATE 2 PUFF(S): 160; 4.5 AEROSOL RESPIRATORY (INHALATION) at 12:37

## 2017-09-09 RX ADMIN — Medication 1 DROP(S): at 17:47

## 2017-09-09 NOTE — PROGRESS NOTE ADULT - SUBJECTIVE AND OBJECTIVE BOX
CARDIOLOGY FOLLOW UP NOTE - DR. DAWSON    Subjective:  no cp/sob      PHYSICAL EXAM:  T(C): 37.1 (09-09-17 @ 12:37), Max: 37.1 (09-09-17 @ 12:37)  HR: 90 (09-09-17 @ 12:37) (90 - 99)  BP: 132/74 (09-09-17 @ 12:37) (132/74 - 150/80)  RR: 17 (09-09-17 @ 12:37) (17 - 17)  SpO2: 97% (09-09-17 @ 12:37) (97% - 100%)  Wt(kg): --  I&O's Summary      Appearance: Normal	  Cardiovascular: Normal S1 S2,RRR, No JVD, No murmurs  Respiratory: Lungs clear to auscultation	  Gastrointestinal:  Soft, Non-tender, + BS	  Extremities: Normal range of motion, No clubbing, cyanosis or edema    MEDICATIONS  (STANDING):  enalapril 5 milliGRAM(s) Oral daily  topiramate 50 milliGRAM(s) Oral at bedtime  citalopram 20 milliGRAM(s) Oral daily  traZODone 300 milliGRAM(s) Oral at bedtime  simvastatin 40 milliGRAM(s) Oral at bedtime  ARIPiprazole 2 milliGRAM(s) Oral daily  buDESOnide 160 MICROgram(s)/formoterol 4.5 MICROgram(s) Inhaler 2 Puff(s) Inhalation two times a day  triamcinolone 0.1% Cream 1 Application(s) Topical daily  famotidine    Tablet 20 milliGRAM(s) Oral two times a day  senna 1 Tablet(s) Oral at bedtime  sucralfate suspension 1 Gram(s) Oral Before meals and at bedtime  methocarbamol 500 milliGRAM(s) Oral four times a day  artificial  tears Solution 1 Drop(s) Both EYES every 6 hours  multivitamin 1 Tablet(s) Oral daily  thiamine 100 milliGRAM(s) Oral daily  ammonium lactate 12% Lotion 1 Application(s) Topical daily  heparin  Injectable 5000 Unit(s) SubCutaneous every 12 hours  pantoprazole    Tablet 40 milliGRAM(s) Oral before breakfast  potassium chloride    Tablet ER 20 milliEquivalent(s) Oral once      TELEMETRY: 	    ECG:  	  RADIOLOGY:   DIAGNOSTIC TESTING:  [ ] Echocardiogram:  [ ] Catheterization:  [ ] Stress Test:    OTHER: 	    LABS:	 	    CARDIAC MARKERS:                                11.4   9.08  )-----------( 217      ( 09 Sep 2017 06:00 )             36.3     09-09    145  |  108<H>  |  24<H>  ----------------------------<  92  3.5   |  22  |  1.08    Ca    9.2      09 Sep 2017 06:00  Phos  3.1     09-08  Mg     2.0     09-09    TPro  7.2  /  Alb  4.4  /  TBili  0.3  /  DBili  x   /  AST  17  /  ALT  17  /  AlkPhos  117  09-07    proBNP:   PT/INR - ( 07 Sep 2017 21:36 )   PT: 10.4 SEC;   INR: 0.93          PTT - ( 07 Sep 2017 21:36 )  PTT:28.7 SEC  Lipid Profile:   HgA1c:

## 2017-09-09 NOTE — CHART NOTE - NSCHARTNOTEFT_GEN_A_CORE
62 year old female w/ history of chronic HA c/o HA 10/10 and states can't sleep. Pt given tramadol without relief. Pt states she has this same type of HA in nursing home and is given morphine. Pt states tramadol did not relieve pain. Pt states pain to entire head related to lights and not sleeping. Denies N/V, denies syncope, dizziness, denies chest pain, SOB, or any numbness or tingling. PE: A0x3, no new focal neuro deficits, lungs clear, S1 S2, pt states she wants morphine that is the only thing that works states she got that last night. Give Morphine .5mg IVP. Pt very anxious. Will observe

## 2017-09-09 NOTE — PROGRESS NOTE ADULT - ASSESSMENT
patient is a 62 year old woman with history of HTN, COPD, anxiety, GERD, depression admitted with recurrent chest pain.  sx associated with shortness of breath.       1. recurrent chest pain  s/p cath no obs cad  sx resolved   echo normal     d/c planning to n home

## 2017-09-10 LAB
BUN SERPL-MCNC: 23 MG/DL — SIGNIFICANT CHANGE UP (ref 7–23)
CALCIUM SERPL-MCNC: 9.3 MG/DL — SIGNIFICANT CHANGE UP (ref 8.4–10.5)
CHLORIDE SERPL-SCNC: 106 MMOL/L — SIGNIFICANT CHANGE UP (ref 98–107)
CO2 SERPL-SCNC: 24 MMOL/L — SIGNIFICANT CHANGE UP (ref 22–31)
CREAT SERPL-MCNC: 1.1 MG/DL — SIGNIFICANT CHANGE UP (ref 0.5–1.3)
GLUCOSE SERPL-MCNC: 100 MG/DL — HIGH (ref 70–99)
HCT VFR BLD CALC: 36.9 % — SIGNIFICANT CHANGE UP (ref 34.5–45)
HGB BLD-MCNC: 11.6 G/DL — SIGNIFICANT CHANGE UP (ref 11.5–15.5)
MAGNESIUM SERPL-MCNC: 2.1 MG/DL — SIGNIFICANT CHANGE UP (ref 1.6–2.6)
MCHC RBC-ENTMCNC: 28.9 PG — SIGNIFICANT CHANGE UP (ref 27–34)
MCHC RBC-ENTMCNC: 31.4 % — LOW (ref 32–36)
MCV RBC AUTO: 91.8 FL — SIGNIFICANT CHANGE UP (ref 80–100)
NRBC # FLD: 0 — SIGNIFICANT CHANGE UP
PLATELET # BLD AUTO: 201 K/UL — SIGNIFICANT CHANGE UP (ref 150–400)
PMV BLD: 10.3 FL — SIGNIFICANT CHANGE UP (ref 7–13)
POTASSIUM SERPL-MCNC: 3.9 MMOL/L — SIGNIFICANT CHANGE UP (ref 3.5–5.3)
POTASSIUM SERPL-SCNC: 3.9 MMOL/L — SIGNIFICANT CHANGE UP (ref 3.5–5.3)
RBC # BLD: 4.02 M/UL — SIGNIFICANT CHANGE UP (ref 3.8–5.2)
RBC # FLD: 13.2 % — SIGNIFICANT CHANGE UP (ref 10.3–14.5)
SODIUM SERPL-SCNC: 143 MMOL/L — SIGNIFICANT CHANGE UP (ref 135–145)
WBC # BLD: 7.56 K/UL — SIGNIFICANT CHANGE UP (ref 3.8–10.5)
WBC # FLD AUTO: 7.56 K/UL — SIGNIFICANT CHANGE UP (ref 3.8–10.5)

## 2017-09-10 PROCEDURE — 70450 CT HEAD/BRAIN W/O DYE: CPT | Mod: 26

## 2017-09-10 RX ORDER — POLYETHYLENE GLYCOL 3350 17 G/17G
17 POWDER, FOR SOLUTION ORAL DAILY
Qty: 0 | Refills: 0 | Status: DISCONTINUED | OUTPATIENT
Start: 2017-09-10 | End: 2017-09-10

## 2017-09-10 RX ORDER — MORPHINE SULFATE 50 MG/1
0.5 CAPSULE, EXTENDED RELEASE ORAL ONCE
Qty: 0 | Refills: 0 | Status: DISCONTINUED | OUTPATIENT
Start: 2017-09-10 | End: 2017-09-10

## 2017-09-10 RX ADMIN — Medication 1 TABLET(S): at 12:12

## 2017-09-10 RX ADMIN — Medication 100 MILLIGRAM(S): at 12:12

## 2017-09-10 RX ADMIN — Medication 1 DROP(S): at 23:02

## 2017-09-10 RX ADMIN — METHOCARBAMOL 500 MILLIGRAM(S): 500 TABLET, FILM COATED ORAL at 17:14

## 2017-09-10 RX ADMIN — SIMVASTATIN 40 MILLIGRAM(S): 20 TABLET, FILM COATED ORAL at 21:02

## 2017-09-10 RX ADMIN — Medication 5 MILLIGRAM(S): at 07:13

## 2017-09-10 RX ADMIN — FAMOTIDINE 20 MILLIGRAM(S): 10 INJECTION INTRAVENOUS at 17:13

## 2017-09-10 RX ADMIN — Medication 1 GRAM(S): at 07:10

## 2017-09-10 RX ADMIN — MORPHINE SULFATE 0.5 MILLIGRAM(S): 50 CAPSULE, EXTENDED RELEASE ORAL at 23:01

## 2017-09-10 RX ADMIN — METHOCARBAMOL 500 MILLIGRAM(S): 500 TABLET, FILM COATED ORAL at 23:02

## 2017-09-10 RX ADMIN — Medication 1 GRAM(S): at 17:13

## 2017-09-10 RX ADMIN — METHOCARBAMOL 500 MILLIGRAM(S): 500 TABLET, FILM COATED ORAL at 07:14

## 2017-09-10 RX ADMIN — SENNA PLUS 1 TABLET(S): 8.6 TABLET ORAL at 21:02

## 2017-09-10 RX ADMIN — PANTOPRAZOLE SODIUM 40 MILLIGRAM(S): 20 TABLET, DELAYED RELEASE ORAL at 07:10

## 2017-09-10 RX ADMIN — HEPARIN SODIUM 5000 UNIT(S): 5000 INJECTION INTRAVENOUS; SUBCUTANEOUS at 17:14

## 2017-09-10 RX ADMIN — Medication 1 GRAM(S): at 12:11

## 2017-09-10 RX ADMIN — Medication 1 GRAM(S): at 21:02

## 2017-09-10 RX ADMIN — Medication 50 MILLIGRAM(S): at 21:02

## 2017-09-10 RX ADMIN — MORPHINE SULFATE 0.5 MILLIGRAM(S): 50 CAPSULE, EXTENDED RELEASE ORAL at 00:18

## 2017-09-10 RX ADMIN — Medication 300 MILLIGRAM(S): at 21:02

## 2017-09-10 RX ADMIN — TRAMADOL HYDROCHLORIDE 50 MILLIGRAM(S): 50 TABLET ORAL at 14:40

## 2017-09-10 RX ADMIN — BUDESONIDE AND FORMOTEROL FUMARATE DIHYDRATE 2 PUFF(S): 160; 4.5 AEROSOL RESPIRATORY (INHALATION) at 21:02

## 2017-09-10 RX ADMIN — Medication 3 MILLIGRAM(S): at 21:02

## 2017-09-10 RX ADMIN — Medication 1 DROP(S): at 17:13

## 2017-09-10 RX ADMIN — ARIPIPRAZOLE 2 MILLIGRAM(S): 15 TABLET ORAL at 12:12

## 2017-09-10 RX ADMIN — CITALOPRAM 20 MILLIGRAM(S): 10 TABLET, FILM COATED ORAL at 12:13

## 2017-09-10 RX ADMIN — Medication 1 DROP(S): at 12:12

## 2017-09-10 RX ADMIN — FAMOTIDINE 20 MILLIGRAM(S): 10 INJECTION INTRAVENOUS at 07:15

## 2017-09-10 RX ADMIN — MORPHINE SULFATE 0.5 MILLIGRAM(S): 50 CAPSULE, EXTENDED RELEASE ORAL at 23:16

## 2017-09-10 RX ADMIN — Medication 1 DROP(S): at 07:15

## 2017-09-10 RX ADMIN — METHOCARBAMOL 500 MILLIGRAM(S): 500 TABLET, FILM COATED ORAL at 12:14

## 2017-09-10 RX ADMIN — HEPARIN SODIUM 5000 UNIT(S): 5000 INJECTION INTRAVENOUS; SUBCUTANEOUS at 07:11

## 2017-09-10 RX ADMIN — BUDESONIDE AND FORMOTEROL FUMARATE DIHYDRATE 2 PUFF(S): 160; 4.5 AEROSOL RESPIRATORY (INHALATION) at 09:03

## 2017-09-10 RX ADMIN — TRAMADOL HYDROCHLORIDE 50 MILLIGRAM(S): 50 TABLET ORAL at 13:44

## 2017-09-10 RX ADMIN — LACTULOSE 20 GRAM(S): 10 SOLUTION ORAL at 17:22

## 2017-09-10 NOTE — PROGRESS NOTE ADULT - ASSESSMENT
patient is a 62 year old woman with history of HTN, COPD, anxiety, GERD, depression admitted with recurrent chest pain.  sx associated with shortness of breath.       1. recurrent chest pain  s/p cath no obs cad  sx resolved   echo normal     2. HA  f/u head ct  pain control       d/c planning to n home  joey

## 2017-09-10 NOTE — PROGRESS NOTE ADULT - SUBJECTIVE AND OBJECTIVE BOX
CARDIOLOGY FOLLOW UP NOTE - DR. DAWSON    Subjective:  no cp/sob  mild ha    PHYSICAL EXAM:  T(C): 36.3 (09-10-17 @ 07:00), Max: 37.1 (09-09-17 @ 12:37)  HR: 83 (09-10-17 @ 07:00) (83 - 100)  BP: 138/85 (09-10-17 @ 07:00) (132/74 - 158/93)  RR: 18 (09-10-17 @ 07:00) (17 - 18)  SpO2: 96% (09-10-17 @ 07:00) (96% - 100%)  Wt(kg): --  I&O's Summary      Appearance: Normal	  Cardiovascular: Normal S1 S2,RRR, No JVD, No murmurs  Respiratory: Lungs clear to auscultation	  Gastrointestinal:  Soft, Non-tender, + BS	  Extremities: Normal range of motion, No clubbing, cyanosis or edema      MEDICATIONS  (STANDING):  enalapril 5 milliGRAM(s) Oral daily  topiramate 50 milliGRAM(s) Oral at bedtime  citalopram 20 milliGRAM(s) Oral daily  traZODone 300 milliGRAM(s) Oral at bedtime  simvastatin 40 milliGRAM(s) Oral at bedtime  ARIPiprazole 2 milliGRAM(s) Oral daily  buDESOnide 160 MICROgram(s)/formoterol 4.5 MICROgram(s) Inhaler 2 Puff(s) Inhalation two times a day  triamcinolone 0.1% Cream 1 Application(s) Topical daily  famotidine    Tablet 20 milliGRAM(s) Oral two times a day  senna 1 Tablet(s) Oral at bedtime  sucralfate suspension 1 Gram(s) Oral Before meals and at bedtime  methocarbamol 500 milliGRAM(s) Oral four times a day  artificial  tears Solution 1 Drop(s) Both EYES every 6 hours  multivitamin 1 Tablet(s) Oral daily  thiamine 100 milliGRAM(s) Oral daily  ammonium lactate 12% Lotion 1 Application(s) Topical daily  heparin  Injectable 5000 Unit(s) SubCutaneous every 12 hours  pantoprazole    Tablet 40 milliGRAM(s) Oral before breakfast      TELEMETRY: 	    ECG:  	  RADIOLOGY:   DIAGNOSTIC TESTING:  [ ] Echocardiogram:  [ ] Catheterization:  [ ] Stress Test:    OTHER: 	    LABS:	 	    CARDIAC MARKERS:                                11.6   7.56  )-----------( 201      ( 10 Sep 2017 07:24 )             36.9     09-10    143  |  106  |  23  ----------------------------<  100<H>  3.9   |  24  |  1.10    Ca    9.3      10 Sep 2017 07:24  Mg     2.1     09-10      proBNP:     Lipid Profile:   HgA1c:

## 2017-09-11 ENCOUNTER — TRANSCRIPTION ENCOUNTER (OUTPATIENT)
Age: 62
End: 2017-09-11

## 2017-09-11 LAB
BUN SERPL-MCNC: 19 MG/DL — SIGNIFICANT CHANGE UP (ref 7–23)
CALCIUM SERPL-MCNC: 9.5 MG/DL — SIGNIFICANT CHANGE UP (ref 8.4–10.5)
CHLORIDE SERPL-SCNC: 107 MMOL/L — SIGNIFICANT CHANGE UP (ref 98–107)
CO2 SERPL-SCNC: 22 MMOL/L — SIGNIFICANT CHANGE UP (ref 22–31)
CREAT SERPL-MCNC: 1.07 MG/DL — SIGNIFICANT CHANGE UP (ref 0.5–1.3)
GLUCOSE SERPL-MCNC: 97 MG/DL — SIGNIFICANT CHANGE UP (ref 70–99)
HCT VFR BLD CALC: 38.4 % — SIGNIFICANT CHANGE UP (ref 34.5–45)
HGB BLD-MCNC: 12 G/DL — SIGNIFICANT CHANGE UP (ref 11.5–15.5)
MCHC RBC-ENTMCNC: 29.2 PG — SIGNIFICANT CHANGE UP (ref 27–34)
MCHC RBC-ENTMCNC: 31.3 % — LOW (ref 32–36)
MCV RBC AUTO: 93.4 FL — SIGNIFICANT CHANGE UP (ref 80–100)
NRBC # FLD: 0 — SIGNIFICANT CHANGE UP
PLATELET # BLD AUTO: 197 K/UL — SIGNIFICANT CHANGE UP (ref 150–400)
PMV BLD: 10.5 FL — SIGNIFICANT CHANGE UP (ref 7–13)
POTASSIUM SERPL-MCNC: 3.9 MMOL/L — SIGNIFICANT CHANGE UP (ref 3.5–5.3)
POTASSIUM SERPL-SCNC: 3.9 MMOL/L — SIGNIFICANT CHANGE UP (ref 3.5–5.3)
RBC # BLD: 4.11 M/UL — SIGNIFICANT CHANGE UP (ref 3.8–5.2)
RBC # FLD: 13.1 % — SIGNIFICANT CHANGE UP (ref 10.3–14.5)
SODIUM SERPL-SCNC: 143 MMOL/L — SIGNIFICANT CHANGE UP (ref 135–145)
WBC # BLD: 6.9 K/UL — SIGNIFICANT CHANGE UP (ref 3.8–10.5)
WBC # FLD AUTO: 6.9 K/UL — SIGNIFICANT CHANGE UP (ref 3.8–10.5)

## 2017-09-11 RX ORDER — LACTULOSE 10 G/15ML
30 SOLUTION ORAL
Qty: 0 | Refills: 0 | COMMUNITY
Start: 2017-09-11

## 2017-09-11 RX ORDER — METOCLOPRAMIDE HCL 10 MG
1 TABLET ORAL
Qty: 0 | Refills: 0 | COMMUNITY

## 2017-09-11 RX ORDER — MORPHINE SULFATE 50 MG/1
0.5 CAPSULE, EXTENDED RELEASE ORAL ONCE
Qty: 0 | Refills: 0 | Status: DISCONTINUED | OUTPATIENT
Start: 2017-09-11 | End: 2017-09-11

## 2017-09-11 RX ORDER — PANTOPRAZOLE SODIUM 20 MG/1
1 TABLET, DELAYED RELEASE ORAL
Qty: 0 | Refills: 0 | COMMUNITY
Start: 2017-09-11

## 2017-09-11 RX ORDER — LACTULOSE 10 G/15ML
30 SOLUTION ORAL
Qty: 0 | Refills: 0 | COMMUNITY

## 2017-09-11 RX ADMIN — SIMVASTATIN 40 MILLIGRAM(S): 20 TABLET, FILM COATED ORAL at 22:07

## 2017-09-11 RX ADMIN — TRAMADOL HYDROCHLORIDE 50 MILLIGRAM(S): 50 TABLET ORAL at 16:50

## 2017-09-11 RX ADMIN — Medication 100 MILLIGRAM(S): at 11:27

## 2017-09-11 RX ADMIN — TRAMADOL HYDROCHLORIDE 50 MILLIGRAM(S): 50 TABLET ORAL at 11:48

## 2017-09-11 RX ADMIN — Medication 3 MILLIGRAM(S): at 22:07

## 2017-09-11 RX ADMIN — Medication 1 GRAM(S): at 06:40

## 2017-09-11 RX ADMIN — ARIPIPRAZOLE 2 MILLIGRAM(S): 15 TABLET ORAL at 11:27

## 2017-09-11 RX ADMIN — Medication 5 MILLIGRAM(S): at 06:40

## 2017-09-11 RX ADMIN — METHOCARBAMOL 500 MILLIGRAM(S): 500 TABLET, FILM COATED ORAL at 17:14

## 2017-09-11 RX ADMIN — FAMOTIDINE 20 MILLIGRAM(S): 10 INJECTION INTRAVENOUS at 06:40

## 2017-09-11 RX ADMIN — Medication 1 DROP(S): at 17:14

## 2017-09-11 RX ADMIN — SENNA PLUS 1 TABLET(S): 8.6 TABLET ORAL at 22:07

## 2017-09-11 RX ADMIN — Medication 1 DROP(S): at 06:40

## 2017-09-11 RX ADMIN — Medication 1 GRAM(S): at 22:07

## 2017-09-11 RX ADMIN — Medication 50 MILLIGRAM(S): at 22:07

## 2017-09-11 RX ADMIN — PANTOPRAZOLE SODIUM 40 MILLIGRAM(S): 20 TABLET, DELAYED RELEASE ORAL at 06:40

## 2017-09-11 RX ADMIN — Medication 1 DROP(S): at 11:27

## 2017-09-11 RX ADMIN — FAMOTIDINE 20 MILLIGRAM(S): 10 INJECTION INTRAVENOUS at 17:14

## 2017-09-11 RX ADMIN — TRAMADOL HYDROCHLORIDE 50 MILLIGRAM(S): 50 TABLET ORAL at 15:53

## 2017-09-11 RX ADMIN — Medication 300 MILLIGRAM(S): at 22:07

## 2017-09-11 RX ADMIN — CITALOPRAM 20 MILLIGRAM(S): 10 TABLET, FILM COATED ORAL at 11:27

## 2017-09-11 RX ADMIN — BUDESONIDE AND FORMOTEROL FUMARATE DIHYDRATE 2 PUFF(S): 160; 4.5 AEROSOL RESPIRATORY (INHALATION) at 22:07

## 2017-09-11 RX ADMIN — METHOCARBAMOL 500 MILLIGRAM(S): 500 TABLET, FILM COATED ORAL at 06:40

## 2017-09-11 RX ADMIN — MORPHINE SULFATE 0.5 MILLIGRAM(S): 50 CAPSULE, EXTENDED RELEASE ORAL at 22:45

## 2017-09-11 RX ADMIN — BUDESONIDE AND FORMOTEROL FUMARATE DIHYDRATE 2 PUFF(S): 160; 4.5 AEROSOL RESPIRATORY (INHALATION) at 11:28

## 2017-09-11 RX ADMIN — Medication 1 GRAM(S): at 15:53

## 2017-09-11 RX ADMIN — HEPARIN SODIUM 5000 UNIT(S): 5000 INJECTION INTRAVENOUS; SUBCUTANEOUS at 17:13

## 2017-09-11 RX ADMIN — MORPHINE SULFATE 0.5 MILLIGRAM(S): 50 CAPSULE, EXTENDED RELEASE ORAL at 23:00

## 2017-09-11 RX ADMIN — HEPARIN SODIUM 5000 UNIT(S): 5000 INJECTION INTRAVENOUS; SUBCUTANEOUS at 06:40

## 2017-09-11 RX ADMIN — Medication 1 TABLET(S): at 11:27

## 2017-09-11 RX ADMIN — TRAMADOL HYDROCHLORIDE 50 MILLIGRAM(S): 50 TABLET ORAL at 12:42

## 2017-09-11 RX ADMIN — METHOCARBAMOL 500 MILLIGRAM(S): 500 TABLET, FILM COATED ORAL at 11:28

## 2017-09-11 RX ADMIN — Medication 1 GRAM(S): at 11:27

## 2017-09-11 NOTE — DISCHARGE NOTE ADULT - HOSPITAL COURSE
61 y/o female with a PMHx of COPD, HTN, GERD, anxiety, and depression presents to ED for chest pain, R/O ACS.    + Chest pain- pending cardiac cath  + 9/8 LHC: normal coronaries, LVEDP 18, RFA accessed  9/9 got 0.5mg IV morphine for 9/10 HA  EKG: NSR with PACs,   CE x3: Negative  CXR: No focal consolidations  HgA1C: 6.4    9/8 Cards note: Recurrent chest pain, unable to perform stress test. Pt still with recurrent symptoms with CAD risk factors. Will proceed with cardiac cath.   9/8 Echo: EF 61%. Calcified aortic valve with normal opening. Normal left ventricular internal dimensions and wall thicknesses. Normal left ventricular systolic function. No segmental wall motion abnormalities. Normal right ventricular size and function.  9/8 LHC: normal coronaries, LVEDP 18, RFA accessed  Cath:  The coronary circulation is right dominant. LM:  Normal. LAD: Normal. Circumflex: Normal. RCA:   --  RCA: Normal.  9/9_cards: 1. recurrent chest pain, s/p cath no obs cad, sx resolved   echo normal, d/c planning to n home     9/10 CT HEAD- NEG	    Discussed with MD - pt stable for discharge to NH, pt with no complaints. Discharge meds reviewed with MD

## 2017-09-11 NOTE — DISCHARGE NOTE ADULT - CARE PROVIDER_API CALL
Axel Benavides (MD), Cardiovascular Disease; Internal Medicine; Interventional Cardiology; Nuclear Cardiology  [address]  [place] [address]  Phone: [phone]  Fax: [phone]

## 2017-09-11 NOTE — PROGRESS NOTE ADULT - SUBJECTIVE AND OBJECTIVE BOX
CARDIOLOGY FOLLOW UP - Dr. Benavides    CC no chest pain or sob   no headache       PHYSICAL EXAM:  T(C): 36.7 (09-11-17 @ 11:20), Max: 36.7 (09-11-17 @ 11:20)  HR: 76 (09-11-17 @ 11:20) (76 - 89)  BP: 129/74 (09-11-17 @ 11:20) (125/70 - 136/80)  RR: 16 (09-11-17 @ 11:20) (16 - 18)  SpO2: 99% (09-11-17 @ 11:20) (97% - 99%)  Wt(kg): --  I&O's Summary      Appearance: Normal	  Cardiovascular: Normal S1 S2,RRR, No JVD, No murmurs  Respiratory: Lungs clear to auscultation	  Gastrointestinal:  Soft, Non-tender, + BS	  Extremities: Normal range of motion, No clubbing, cyanosis or edema        MEDICATIONS  (STANDING):  enalapril 5 milliGRAM(s) Oral daily  topiramate 50 milliGRAM(s) Oral at bedtime  citalopram 20 milliGRAM(s) Oral daily  traZODone 300 milliGRAM(s) Oral at bedtime  simvastatin 40 milliGRAM(s) Oral at bedtime  ARIPiprazole 2 milliGRAM(s) Oral daily  buDESOnide 160 MICROgram(s)/formoterol 4.5 MICROgram(s) Inhaler 2 Puff(s) Inhalation two times a day  triamcinolone 0.1% Cream 1 Application(s) Topical daily  famotidine    Tablet 20 milliGRAM(s) Oral two times a day  senna 1 Tablet(s) Oral at bedtime  sucralfate suspension 1 Gram(s) Oral Before meals and at bedtime  methocarbamol 500 milliGRAM(s) Oral four times a day  artificial  tears Solution 1 Drop(s) Both EYES every 6 hours  multivitamin 1 Tablet(s) Oral daily  thiamine 100 milliGRAM(s) Oral daily  ammonium lactate 12% Lotion 1 Application(s) Topical daily  heparin  Injectable 5000 Unit(s) SubCutaneous every 12 hours  pantoprazole    Tablet 40 milliGRAM(s) Oral before breakfast      TELEMETRY: NSR 	    ECG:  	  RADIOLOGY:   DIAGNOSTIC TESTING:  [x ] Echocardiogram:  < from: Transthoracic Echocardiogram (09.08.17 @ 10:43) >  Ejection Fraction (Teicholtz): 61 %      < from: Transthoracic Echocardiogram (09.08.17 @ 10:43) >  ------------------------------------------------------------------------  CONCLUSIONS:  1. Calcified aortic valve with normal opening.  2. Normal left ventricular internal dimensions and wall  thicknesses.  3. Normal left ventricular systolic function. No segmental  wall motion abnormalities.  4. Normal right ventricular size and function.  ------------------------------------------------------------------------    < end of copied text >    [x ]  Catheterization:  < from: Cardiac Cath Lab - Adult (09.08.17 @ 17:52) >  CORONARY VESSELS: The coronary circulation is right dominant.  LM:   --  LM: Normal.  LAD:   --  LAD: Normal.  CX:   --  Circumflex: Normal.  RCA:   --  RCA: Normal.  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  DIAGNOSTIC RECOMMENDATIONS: Medical management is recommended.    < end of copied text >    [ ] Stress Test:    OTHER: 	  < from: CT Head No Cont (09.10.17 @ 09:25) >  IMPRESSION:     No gross acute intracranial pathology. If the patient remains   symptomatic, consider short interval follow-up head CT or brain MRI   follow-up.        < end of copied text >    LABS:	 	                                12.0   6.90  )-----------( 197      ( 11 Sep 2017 05:45 )             38.4     09-11    143  |  107  |  19  ----------------------------<  97  3.9   |  22  |  1.07    Ca    9.5      11 Sep 2017 05:45  Mg     2.1     09-10

## 2017-09-11 NOTE — DISCHARGE NOTE ADULT - MEDICATION SUMMARY - MEDICATIONS TO TAKE
I will START or STAY ON the medications listed below when I get home from the hospital:    acetaminophen 650 mg oral tablet  -- 1 tab(s) by mouth every 6 hours, As Needed  -- Indication: For pain    traMADol 50 mg oral tablet  -- 1 tab(s) by mouth every 4 hours, As Needed  -- Indication: For pain    enalapril 5 mg oral tablet  -- 1 tab(s) by mouth once a day  -- Indication: For HTN    aluminum hydroxide-magnesium hydroxide 200 mg-200 mg/5 mL oral suspension  -- 30 milliliter(s) by mouth every 6 hours, As needed, Dyspepsia  -- Indication: For Antacid    topiramate 50 mg oral tablet  -- 1 tab(s) by mouth once a day (at bedtime)  -- Indication: For migraines    traZODone 300 mg oral tablet  -- 1 tab(s) by mouth once a day (at bedtime)  -- Indication: For Depression    citalopram 20 mg oral tablet  -- 1 tab(s) by mouth once a day  -- Indication: For Depression    simvastatin 40 mg oral tablet  -- 1 tab(s) by mouth once a day (at bedtime)  -- Indication: For hyperlipidemia    ARIPiprazole 2 mg oral tablet  -- 1 tab(s) by mouth once a day  -- Indication: For Depression    Symbicort 160 mcg-4.5 mcg/inh inhalation aerosol  -- 1 puff(s) inhaled 2 times a day  -- Indication: For inhaler    triamcinolone 0.1% topical cream  -- Apply on skin to affected area once a day  -- Indication: For Cream    Reshma-Hydrolac 5% topical lotion  -- Apply on skin to affected area 2 times a day  -- Indication: For lotion    Lotrisone 1%-0.05% topical cream  -- Apply on skin to affected area 2 times a day  -- Indication: For Cream    famotidine 20 mg oral tablet  -- 1 tab(s) by mouth 2 times a day  -- Indication: For GERD (gastroesophageal reflux disease)    Senna 8.6 mg oral tablet  -- 1 tab(s) by mouth once a day (at bedtime)  -- Indication: For Constipation    lactulose 10 g/15 mL oral syrup  -- 30 milliliter(s) by mouth every 8 hours, As needed, Constipation  -- Indication: For Constipation    Carafate 1 g/10 mL oral suspension  -- 10 milliliter(s) by mouth 4 times a day (before meals and at bedtime)  -- Indication: For GERD (gastroesophageal reflux disease)    methocarbamol 500 mg oral tablet  -- 1 tab(s) by mouth 4 times a day  -- Indication: For muscle relaxant    Melatonin 3 mg oral tablet  -- 1 tab(s) by mouth once (at bedtime)  -- Indication: For insomnia    Artificial Tears ophthalmic solution  -- 1 drop(s) to each affected eye 4 times a day  -- Indication: For Artificial tears    pantoprazole 40 mg oral delayed release tablet  -- 1 tab(s) by mouth once a day (before a meal)  -- Indication: For GERD (gastroesophageal reflux disease)    Folbic oral tablet  -- 1 tab(s) by mouth once a day  -- Indication: For supplement    multivitamin with minerals  -- 1 tab(s)  once a day  -- Indication: For supplement    thiamine 100 mg oral tablet  -- 1 tab(s) by mouth once a day  -- Indication: For supplement

## 2017-09-11 NOTE — DISCHARGE NOTE ADULT - CARE PLAN
Principal Discharge DX:	Chest pain, unspecified type  Goal:	prevent further episodes  Instructions for follow-up, activity and diet:	follow up with your PMD in one week - call for appointment Principal Discharge DX:	Chest pain, unspecified type  Goal:	prevent further episodes  Instructions for follow-up, activity and diet:	follow up with your PMD in one week - call for appointment  Secondary Diagnosis:	Anxiety  Secondary Diagnosis:	Depression  Secondary Diagnosis:	GERD (gastroesophageal reflux disease)

## 2017-09-11 NOTE — CHART NOTE - NSCHARTNOTEFT_GEN_A_CORE
62 year old female w/ history of chronic HA c/o HA 9/10 frontal region.  Pt states she has this same type of HA in nursing home and is given morphine.  PE: A0x3, no new focal neuro deficits, pt refused tramadol stating that it does not provide relief and causes her constipation. She received Morphine 0.5mg IVP previously and it gave relief.  Will give dose Morphine and monitor.    Samara CAMPOVERDE  [id number]

## 2017-09-11 NOTE — DISCHARGE NOTE ADULT - COMMUNITY RESOURCES
[facility] [address], [place], [phone], [facility] [phone]

## 2017-09-11 NOTE — DISCHARGE NOTE ADULT - PATIENT PORTAL LINK FT
“You can access the [facility] Patient Portal, offered by [facility], by registering with the following website: [email or web address]”

## 2017-09-11 NOTE — PROGRESS NOTE ADULT - ASSESSMENT
62 year old woman with history of HTN, COPD, anxiety, GERD, depression admitted with recurrent chest pain/ associated with shortness of breath.       1. recurrent chest pain  resolved   no chest pain or sob   cv stable   s/p cath no obstructive  cad  echo normal     2. HA  resolved   head CT negative   pain control     3. HTN   bp acceptable   continue current antihtn regimen     dvt ppx   d/c planning today

## 2017-09-12 VITALS
RESPIRATION RATE: 18 BRPM | SYSTOLIC BLOOD PRESSURE: 122 MMHG | HEART RATE: 88 BPM | DIASTOLIC BLOOD PRESSURE: 60 MMHG | OXYGEN SATURATION: 98 % | TEMPERATURE: 98 F

## 2017-09-12 RX ADMIN — Medication 1 GRAM(S): at 06:27

## 2017-09-12 RX ADMIN — Medication 1 TABLET(S): at 10:31

## 2017-09-12 RX ADMIN — PANTOPRAZOLE SODIUM 40 MILLIGRAM(S): 20 TABLET, DELAYED RELEASE ORAL at 06:27

## 2017-09-12 RX ADMIN — FAMOTIDINE 20 MILLIGRAM(S): 10 INJECTION INTRAVENOUS at 06:27

## 2017-09-12 RX ADMIN — Medication 5 MILLIGRAM(S): at 06:27

## 2017-09-12 RX ADMIN — HEPARIN SODIUM 5000 UNIT(S): 5000 INJECTION INTRAVENOUS; SUBCUTANEOUS at 06:27

## 2017-09-12 RX ADMIN — Medication 1 DROP(S): at 06:26

## 2017-09-12 RX ADMIN — METHOCARBAMOL 500 MILLIGRAM(S): 500 TABLET, FILM COATED ORAL at 06:27

## 2017-09-12 RX ADMIN — CITALOPRAM 20 MILLIGRAM(S): 10 TABLET, FILM COATED ORAL at 10:32

## 2017-09-12 RX ADMIN — BUDESONIDE AND FORMOTEROL FUMARATE DIHYDRATE 2 PUFF(S): 160; 4.5 AEROSOL RESPIRATORY (INHALATION) at 09:04

## 2017-09-12 RX ADMIN — Medication 100 MILLIGRAM(S): at 10:31

## 2017-09-12 RX ADMIN — Medication 1 DROP(S): at 10:32

## 2017-09-12 RX ADMIN — Medication 1 GRAM(S): at 10:31

## 2017-09-12 RX ADMIN — Medication 1 DROP(S): at 00:00

## 2017-09-12 RX ADMIN — METHOCARBAMOL 500 MILLIGRAM(S): 500 TABLET, FILM COATED ORAL at 10:32

## 2017-09-12 RX ADMIN — ARIPIPRAZOLE 2 MILLIGRAM(S): 15 TABLET ORAL at 10:31

## 2017-09-12 RX ADMIN — METHOCARBAMOL 500 MILLIGRAM(S): 500 TABLET, FILM COATED ORAL at 00:00

## 2017-09-12 NOTE — PHYSICAL THERAPY INITIAL EVALUATION ADULT - PERTINENT HX OF CURRENT PROBLEM, REHAB EVAL
Patient is a 62 y.o female with complaints of chest pain as the reason for her admission. Past medical history of COPD, GERD, depression.

## 2017-09-12 NOTE — PROGRESS NOTE ADULT - SUBJECTIVE AND OBJECTIVE BOX
CC: no new complaints    TELEMETRY: no events    PHYSICAL EXAM:    T(C): 36.9 (09-12-17 @ 10:30), Max: 36.9 (09-11-17 @ 22:02)  HR: 88 (09-12-17 @ 10:30) (78 - 88)  BP: 122/60 (09-12-17 @ 10:30) (105/60 - 148/90)  RR: 18 (09-12-17 @ 10:30) (16 - 18)  SpO2: 98% (09-12-17 @ 10:30) (97% - 98%)  Wt(kg): --  I&O's Summary      Appearance: Normal	  Cardiovascular: Normal S1 S2,RRR, No JVD, No murmurs  Respiratory: Lungs clear to auscultation	  Gastrointestinal:  Soft, Non-tender, + BS	  Extremities: Normal range of motion, No clubbing, cyanosis or edema  Vascular: Peripheral pulses palpable 2+ bilaterally     LABS:	 	                          12.0   6.90  )-----------( 197      ( 11 Sep 2017 05:45 )             38.4     09-11    143  |  107  |  19  ----------------------------<  97  3.9   |  22  |  1.07    Ca    9.5      11 Sep 2017 05:45            CARDIAC MARKERS:

## 2017-10-04 RX ORDER — CIPROFLOXACIN LACTATE 400MG/40ML
1 VIAL (ML) INTRAVENOUS
Qty: 0 | Refills: 0 | COMMUNITY
Start: 2017-10-04

## 2017-10-04 RX ORDER — AZITHROMYCIN 500 MG/1
0 TABLET, FILM COATED ORAL
Qty: 0 | Refills: 0 | COMMUNITY
Start: 2017-10-04

## 2017-10-04 RX ORDER — ALBUTEROL 90 UG/1
3 AEROSOL, METERED ORAL
Qty: 0 | Refills: 0 | DISCHARGE
Start: 2017-10-04

## 2017-10-04 RX ORDER — DEXTROMETHORPHAN HYDROBROMIDE AND PROMETHAZINE HYDROCHLORIDE 15; 6.25 MG/5ML; MG/5ML
10 SYRUP ORAL
Qty: 0 | Refills: 0 | COMMUNITY
Start: 2017-10-04

## 2017-10-05 ENCOUNTER — INPATIENT (INPATIENT)
Facility: HOSPITAL | Age: 62
LOS: 0 days | Discharge: NURSING, REHAB OR LONG-TERM CARE | End: 2017-10-06
Attending: HOSPITALIST | Admitting: HOSPITALIST
Payer: MEDICAID

## 2017-10-05 VITALS
DIASTOLIC BLOOD PRESSURE: 70 MMHG | RESPIRATION RATE: 24 BRPM | TEMPERATURE: 98 F | HEART RATE: 90 BPM | SYSTOLIC BLOOD PRESSURE: 127 MMHG

## 2017-10-05 DIAGNOSIS — Z96.659 PRESENCE OF UNSPECIFIED ARTIFICIAL KNEE JOINT: Chronic | ICD-10-CM

## 2017-10-05 DIAGNOSIS — R05 COUGH: ICD-10-CM

## 2017-10-05 DIAGNOSIS — J44.9 CHRONIC OBSTRUCTIVE PULMONARY DISEASE, UNSPECIFIED: ICD-10-CM

## 2017-10-05 DIAGNOSIS — M79.89 OTHER SPECIFIED SOFT TISSUE DISORDERS: ICD-10-CM

## 2017-10-05 DIAGNOSIS — K21.9 GASTRO-ESOPHAGEAL REFLUX DISEASE WITHOUT ESOPHAGITIS: ICD-10-CM

## 2017-10-05 DIAGNOSIS — I10 ESSENTIAL (PRIMARY) HYPERTENSION: ICD-10-CM

## 2017-10-05 DIAGNOSIS — J18.9 PNEUMONIA, UNSPECIFIED ORGANISM: ICD-10-CM

## 2017-10-05 DIAGNOSIS — F41.9 ANXIETY DISORDER, UNSPECIFIED: ICD-10-CM

## 2017-10-05 LAB
ALBUMIN SERPL ELPH-MCNC: 4.4 G/DL — SIGNIFICANT CHANGE UP (ref 3.3–5)
ALP SERPL-CCNC: 116 U/L — SIGNIFICANT CHANGE UP (ref 40–120)
ALT FLD-CCNC: 18 U/L — SIGNIFICANT CHANGE UP (ref 4–33)
APTT BLD: 28.6 SEC — SIGNIFICANT CHANGE UP (ref 27.5–37.4)
AST SERPL-CCNC: 18 U/L — SIGNIFICANT CHANGE UP (ref 4–32)
BASE EXCESS BLDV CALC-SCNC: 3.5 MMOL/L — SIGNIFICANT CHANGE UP
BASOPHILS # BLD AUTO: 0.04 K/UL — SIGNIFICANT CHANGE UP (ref 0–0.2)
BASOPHILS NFR BLD AUTO: 0.5 % — SIGNIFICANT CHANGE UP (ref 0–2)
BILIRUB SERPL-MCNC: 0.5 MG/DL — SIGNIFICANT CHANGE UP (ref 0.2–1.2)
BLOOD GAS VENOUS - CREATININE: 1.12 MG/DL — SIGNIFICANT CHANGE UP (ref 0.5–1.3)
BUN SERPL-MCNC: 18 MG/DL — SIGNIFICANT CHANGE UP (ref 7–23)
CALCIUM SERPL-MCNC: 9.8 MG/DL — SIGNIFICANT CHANGE UP (ref 8.4–10.5)
CHLORIDE BLDV-SCNC: 103 MMOL/L — SIGNIFICANT CHANGE UP (ref 96–108)
CHLORIDE SERPL-SCNC: 99 MMOL/L — SIGNIFICANT CHANGE UP (ref 98–107)
CK MB BLD-MCNC: 1.94 NG/ML — SIGNIFICANT CHANGE UP (ref 1–4.7)
CK MB BLD-MCNC: SIGNIFICANT CHANGE UP (ref 0–2.5)
CK SERPL-CCNC: 140 U/L — SIGNIFICANT CHANGE UP (ref 25–170)
CO2 SERPL-SCNC: 27 MMOL/L — SIGNIFICANT CHANGE UP (ref 22–31)
CREAT SERPL-MCNC: 1.23 MG/DL — SIGNIFICANT CHANGE UP (ref 0.5–1.3)
D DIMER BLD IA.RAPID-MCNC: 164 NG/ML — SIGNIFICANT CHANGE UP
EOSINOPHIL # BLD AUTO: 0.27 K/UL — SIGNIFICANT CHANGE UP (ref 0–0.5)
EOSINOPHIL NFR BLD AUTO: 3.7 % — SIGNIFICANT CHANGE UP (ref 0–6)
GAS PNL BLDV: 141 MMOL/L — SIGNIFICANT CHANGE UP (ref 136–146)
GLUCOSE BLDV-MCNC: 107 — HIGH (ref 70–99)
GLUCOSE SERPL-MCNC: 106 MG/DL — HIGH (ref 70–99)
HCO3 BLDV-SCNC: 26 MMOL/L — SIGNIFICANT CHANGE UP (ref 20–27)
HCT VFR BLD CALC: 39.6 % — SIGNIFICANT CHANGE UP (ref 34.5–45)
HCT VFR BLDV CALC: 40.3 % — SIGNIFICANT CHANGE UP (ref 34.5–45)
HGB BLD-MCNC: 12.6 G/DL — SIGNIFICANT CHANGE UP (ref 11.5–15.5)
HGB BLDV-MCNC: 13.1 G/DL — SIGNIFICANT CHANGE UP (ref 11.5–15.5)
IMM GRANULOCYTES # BLD AUTO: 0.03 # — SIGNIFICANT CHANGE UP
IMM GRANULOCYTES NFR BLD AUTO: 0.4 % — SIGNIFICANT CHANGE UP (ref 0–1.5)
INR BLD: 1.07 — SIGNIFICANT CHANGE UP (ref 0.88–1.17)
LACTATE BLDV-MCNC: 1.6 MMOL/L — SIGNIFICANT CHANGE UP (ref 0.5–2)
LYMPHOCYTES # BLD AUTO: 2.24 K/UL — SIGNIFICANT CHANGE UP (ref 1–3.3)
LYMPHOCYTES # BLD AUTO: 30.7 % — SIGNIFICANT CHANGE UP (ref 13–44)
MCHC RBC-ENTMCNC: 29.2 PG — SIGNIFICANT CHANGE UP (ref 27–34)
MCHC RBC-ENTMCNC: 31.8 % — LOW (ref 32–36)
MCV RBC AUTO: 91.9 FL — SIGNIFICANT CHANGE UP (ref 80–100)
MONOCYTES # BLD AUTO: 0.57 K/UL — SIGNIFICANT CHANGE UP (ref 0–0.9)
MONOCYTES NFR BLD AUTO: 7.8 % — SIGNIFICANT CHANGE UP (ref 2–14)
NEUTROPHILS # BLD AUTO: 4.14 K/UL — SIGNIFICANT CHANGE UP (ref 1.8–7.4)
NEUTROPHILS NFR BLD AUTO: 56.9 % — SIGNIFICANT CHANGE UP (ref 43–77)
NRBC # FLD: 0 — SIGNIFICANT CHANGE UP
PCO2 BLDV: 48 MMHG — SIGNIFICANT CHANGE UP (ref 41–51)
PH BLDV: 7.38 PH — SIGNIFICANT CHANGE UP (ref 7.32–7.43)
PLATELET # BLD AUTO: 194 K/UL — SIGNIFICANT CHANGE UP (ref 150–400)
PMV BLD: 10.6 FL — SIGNIFICANT CHANGE UP (ref 7–13)
PO2 BLDV: 30 MMHG — LOW (ref 35–40)
POTASSIUM BLDV-SCNC: 2.7 MMOL/L — CRITICAL LOW (ref 3.4–4.5)
POTASSIUM SERPL-MCNC: 2.9 MMOL/L — CRITICAL LOW (ref 3.5–5.3)
POTASSIUM SERPL-SCNC: 2.9 MMOL/L — CRITICAL LOW (ref 3.5–5.3)
PROT SERPL-MCNC: 7.3 G/DL — SIGNIFICANT CHANGE UP (ref 6–8.3)
PROTHROM AB SERPL-ACNC: 12 SEC — SIGNIFICANT CHANGE UP (ref 9.8–13.1)
RBC # BLD: 4.31 M/UL — SIGNIFICANT CHANGE UP (ref 3.8–5.2)
RBC # FLD: 13.2 % — SIGNIFICANT CHANGE UP (ref 10.3–14.5)
SAO2 % BLDV: 48.4 % — LOW (ref 60–85)
SODIUM SERPL-SCNC: 142 MMOL/L — SIGNIFICANT CHANGE UP (ref 135–145)
TROPONIN T SERPL-MCNC: < 0.06 NG/ML — SIGNIFICANT CHANGE UP (ref 0–0.06)
WBC # BLD: 7.29 K/UL — SIGNIFICANT CHANGE UP (ref 3.8–10.5)
WBC # FLD AUTO: 7.29 K/UL — SIGNIFICANT CHANGE UP (ref 3.8–10.5)

## 2017-10-05 PROCEDURE — 71010: CPT | Mod: 26

## 2017-10-05 PROCEDURE — 93970 EXTREMITY STUDY: CPT | Mod: 26

## 2017-10-05 PROCEDURE — 99223 1ST HOSP IP/OBS HIGH 75: CPT | Mod: GC

## 2017-10-05 RX ORDER — POTASSIUM CHLORIDE 20 MEQ
40 PACKET (EA) ORAL ONCE
Qty: 0 | Refills: 0 | Status: COMPLETED | OUTPATIENT
Start: 2017-10-05 | End: 2017-10-05

## 2017-10-05 RX ORDER — SOD,AMMONIUM,POTASSIUM LACTATE
1 CREAM (GRAM) TOPICAL
Qty: 0 | Refills: 0 | COMMUNITY

## 2017-10-05 RX ORDER — HEPARIN SODIUM 5000 [USP'U]/ML
5000 INJECTION INTRAVENOUS; SUBCUTANEOUS EVERY 8 HOURS
Qty: 0 | Refills: 0 | Status: DISCONTINUED | OUTPATIENT
Start: 2017-10-05 | End: 2017-10-06

## 2017-10-05 RX ORDER — PIPERACILLIN AND TAZOBACTAM 4; .5 G/20ML; G/20ML
3.38 INJECTION, POWDER, LYOPHILIZED, FOR SOLUTION INTRAVENOUS ONCE
Qty: 0 | Refills: 0 | Status: DISCONTINUED | OUTPATIENT
Start: 2017-10-05 | End: 2017-10-05

## 2017-10-05 RX ORDER — IPRATROPIUM/ALBUTEROL SULFATE 18-103MCG
3 AEROSOL WITH ADAPTER (GRAM) INHALATION ONCE
Qty: 0 | Refills: 0 | Status: COMPLETED | OUTPATIENT
Start: 2017-10-05 | End: 2017-10-05

## 2017-10-05 RX ORDER — MULTIVIT-MIN/FERROUS GLUCONATE 9 MG/15 ML
1 LIQUID (ML) ORAL
Qty: 0 | Refills: 0 | COMMUNITY

## 2017-10-05 RX ORDER — TOPIRAMATE 25 MG
50 TABLET ORAL AT BEDTIME
Qty: 0 | Refills: 0 | Status: DISCONTINUED | OUTPATIENT
Start: 2017-10-05 | End: 2017-10-06

## 2017-10-05 RX ORDER — AZITHROMYCIN 500 MG/1
500 TABLET, FILM COATED ORAL ONCE
Qty: 0 | Refills: 0 | Status: COMPLETED | OUTPATIENT
Start: 2017-10-05 | End: 2017-10-05

## 2017-10-05 RX ORDER — LANOLIN ALCOHOL/MO/W.PET/CERES
3 CREAM (GRAM) TOPICAL AT BEDTIME
Qty: 0 | Refills: 0 | Status: DISCONTINUED | OUTPATIENT
Start: 2017-10-05 | End: 2017-10-06

## 2017-10-05 RX ORDER — ONDANSETRON 8 MG/1
4 TABLET, FILM COATED ORAL ONCE
Qty: 0 | Refills: 0 | Status: COMPLETED | OUTPATIENT
Start: 2017-10-05 | End: 2017-10-05

## 2017-10-05 RX ORDER — SIMVASTATIN 20 MG/1
40 TABLET, FILM COATED ORAL AT BEDTIME
Qty: 0 | Refills: 0 | Status: DISCONTINUED | OUTPATIENT
Start: 2017-10-05 | End: 2017-10-06

## 2017-10-05 RX ORDER — FAMOTIDINE 10 MG/ML
20 INJECTION INTRAVENOUS DAILY
Qty: 0 | Refills: 0 | Status: DISCONTINUED | OUTPATIENT
Start: 2017-10-05 | End: 2017-10-06

## 2017-10-05 RX ORDER — TRAZODONE HCL 50 MG
150 TABLET ORAL
Qty: 0 | Refills: 0 | Status: DISCONTINUED | OUTPATIENT
Start: 2017-10-05 | End: 2017-10-05

## 2017-10-05 RX ORDER — VANCOMYCIN HCL 1 G
1000 VIAL (EA) INTRAVENOUS ONCE
Qty: 0 | Refills: 0 | Status: DISCONTINUED | OUTPATIENT
Start: 2017-10-05 | End: 2017-10-05

## 2017-10-05 RX ORDER — SUCRALFATE 1 G
1 TABLET ORAL
Qty: 0 | Refills: 0 | Status: DISCONTINUED | OUTPATIENT
Start: 2017-10-05 | End: 2017-10-06

## 2017-10-05 RX ORDER — FAMOTIDINE 10 MG/ML
20 INJECTION INTRAVENOUS
Qty: 0 | Refills: 0 | Status: DISCONTINUED | OUTPATIENT
Start: 2017-10-05 | End: 2017-10-05

## 2017-10-05 RX ORDER — TRAZODONE HCL 50 MG
300 TABLET ORAL AT BEDTIME
Qty: 0 | Refills: 0 | Status: DISCONTINUED | OUTPATIENT
Start: 2017-10-05 | End: 2017-10-06

## 2017-10-05 RX ORDER — BUDESONIDE AND FORMOTEROL FUMARATE DIHYDRATE 160; 4.5 UG/1; UG/1
2 AEROSOL RESPIRATORY (INHALATION)
Qty: 0 | Refills: 0 | Status: DISCONTINUED | OUTPATIENT
Start: 2017-10-05 | End: 2017-10-06

## 2017-10-05 RX ORDER — IPRATROPIUM/ALBUTEROL SULFATE 18-103MCG
3 AEROSOL WITH ADAPTER (GRAM) INHALATION EVERY 6 HOURS
Qty: 0 | Refills: 0 | Status: DISCONTINUED | OUTPATIENT
Start: 2017-10-05 | End: 2017-10-06

## 2017-10-05 RX ORDER — POTASSIUM CHLORIDE 20 MEQ
10 PACKET (EA) ORAL
Qty: 0 | Refills: 0 | Status: COMPLETED | OUTPATIENT
Start: 2017-10-05 | End: 2017-10-05

## 2017-10-05 RX ORDER — THIAMINE MONONITRATE (VIT B1) 100 MG
100 TABLET ORAL DAILY
Qty: 0 | Refills: 0 | Status: DISCONTINUED | OUTPATIENT
Start: 2017-10-05 | End: 2017-10-06

## 2017-10-05 RX ORDER — CLOTRIMAZOLE AND BETAMETHASONE DIPROPIONATE 10; .5 MG/G; MG/G
1 CREAM TOPICAL
Qty: 0 | Refills: 0 | COMMUNITY

## 2017-10-05 RX ORDER — SODIUM CHLORIDE 9 MG/ML
1000 INJECTION INTRAMUSCULAR; INTRAVENOUS; SUBCUTANEOUS
Qty: 0 | Refills: 0 | Status: DISCONTINUED | OUTPATIENT
Start: 2017-10-05 | End: 2017-10-06

## 2017-10-05 RX ADMIN — Medication 100 MILLIEQUIVALENT(S): at 15:58

## 2017-10-05 RX ADMIN — Medication 3 MILLILITER(S): at 22:37

## 2017-10-05 RX ADMIN — Medication 50 MILLIGRAM(S): at 23:05

## 2017-10-05 RX ADMIN — Medication 3 MILLILITER(S): at 13:34

## 2017-10-05 RX ADMIN — Medication 3 MILLILITER(S): at 13:43

## 2017-10-05 RX ADMIN — Medication 3 MILLIGRAM(S): at 23:06

## 2017-10-05 RX ADMIN — ONDANSETRON 4 MILLIGRAM(S): 8 TABLET, FILM COATED ORAL at 23:06

## 2017-10-05 RX ADMIN — Medication 100 MILLIEQUIVALENT(S): at 18:12

## 2017-10-05 RX ADMIN — SODIUM CHLORIDE 75 MILLILITER(S): 9 INJECTION INTRAMUSCULAR; INTRAVENOUS; SUBCUTANEOUS at 20:06

## 2017-10-05 RX ADMIN — BUDESONIDE AND FORMOTEROL FUMARATE DIHYDRATE 2 PUFF(S): 160; 4.5 AEROSOL RESPIRATORY (INHALATION) at 23:06

## 2017-10-05 RX ADMIN — Medication 40 MILLIEQUIVALENT(S): at 15:42

## 2017-10-05 RX ADMIN — HEPARIN SODIUM 5000 UNIT(S): 5000 INJECTION INTRAVENOUS; SUBCUTANEOUS at 23:06

## 2017-10-05 RX ADMIN — AZITHROMYCIN 250 MILLIGRAM(S): 500 TABLET, FILM COATED ORAL at 19:20

## 2017-10-05 RX ADMIN — SIMVASTATIN 40 MILLIGRAM(S): 20 TABLET, FILM COATED ORAL at 23:05

## 2017-10-05 RX ADMIN — Medication 60 MILLIGRAM(S): at 13:27

## 2017-10-05 RX ADMIN — Medication 100 MILLIEQUIVALENT(S): at 17:18

## 2017-10-05 RX ADMIN — Medication 3 MILLILITER(S): at 13:27

## 2017-10-05 RX ADMIN — Medication 3 MILLILITER(S): at 17:45

## 2017-10-05 RX ADMIN — Medication 1 GRAM(S): at 23:06

## 2017-10-05 RX ADMIN — Medication 100 MILLIGRAM(S): at 22:09

## 2017-10-05 NOTE — ED PROVIDER NOTE - ATTENDING CONTRIBUTION TO CARE
agree with resident note  62F h/o depression, HTN, COPD p/w SOB x 2 weeks was started on CAP abx without improvement prompting them to send her to ER.  States mildly SOB.  Denies CP.      PE: hypoxic into high 80's mild respiratory distress; expiratory wheezing; s1 s2 no m/r/g abd soft/NT/ND ext: no edema

## 2017-10-05 NOTE — ED ADULT NURSE REASSESSMENT NOTE - NS ED NURSE REASSESS COMMENT FT1
pt returned back from Ultrasound. VSS as charted. pt waiting for results and disposition. Will continue to monitor. pt in no acute distress.

## 2017-10-05 NOTE — H&P ADULT - PROBLEM SELECTOR PLAN 3
Warm, erythematous lower extremities bilaterally  - Possible cellulitis  - Will cover with antibiotic regimen Warm, erythematous lower extremities bilaterally  - Possible cellulitis  - Will cover with levaquin.

## 2017-10-05 NOTE — ED PROVIDER NOTE - PROGRESS NOTE DETAILS
Henry: Discussed plan to admit with staff at [facility] and with pt. Will place pt on tele for hypokalemia. Gollogly: Discussed plan to admit with staff at [facility] and with pt. D-dimer negative and LE duplex negative - do not think this is PE. Will place pt on tele for hypokalemia.

## 2017-10-05 NOTE — H&P ADULT - PROBLEM SELECTOR PLAN 1
2 weeks of shortness of breath and cough productive of sputum likely secondary to HCAP vs COPD exacerbation with URI.  - Received Duonebs, antibiotics, and steroids in ED  - repeat CXR and further assessment 2 weeks of shortness of breath and cough productive of sputum likely secondary to HCAP vs COPD exacerbation with URI.  - Received Duonebs, antibiotics, and steroids in ED  - Continue duonebs prn every 6 hours as needed,  - Will switch to levaquin tomorrow.     - C/w steroids 40mg qd for likely copd exacerbation.

## 2017-10-05 NOTE — H&P ADULT - PROBLEM SELECTOR PLAN 2
COPD not on home O2  - Possible COPD exacerbation with increased cough, increased sputum production, and dyspnea  - Will do med rec and start on home controller meds COPD not on home O2  - Possible COPD exacerbation with increased cough, increased sputum production, and dyspnea  - Will do med rec and start on home controller meds  - Continue duonebs prn every 6 hours  - c/w prednisone  - levaquin 500mg qd

## 2017-10-05 NOTE — ED ADULT NURSE NOTE - PMH
Anxiety    Chronic obstructive pulmonary disease, unspecified COPD type    Depression    Essential hypertension    GERD (gastroesophageal reflux disease)

## 2017-10-05 NOTE — H&P ADULT - RS GEN PE MLT RESP DETAILS PC
wheezes/chest wall tenderness/diminished breath sounds, L/diminished breath sounds, R/diminished at bases/respirations labored

## 2017-10-05 NOTE — ED PROVIDER NOTE - OBJECTIVE STATEMENT
62F h/o depression, HTN, COPD p/w SOB x 2 weeks 62F h/o depression, HTN, COPD p/w SOB x 2 weeks. Pt from [facility], minimally ambulatory x 5 years (since knee replacement surgery). Pt placed on abx at NH (azithromycin and 1 other medication without improved). +non-productive cough, chills, post-tussive emesis x1 and pleuritic chest discomfort. No fevers, abd pain, diarrhea, or dysuria. Notes swelling of b/l LE, L > R, no hx of DVT/PE. 62F h/o depression, HTN, COPD p/w SOB x 2 weeks. Pt from [facility], minimally ambulatory x 5 years (since knee replacement surgery). Pt placed on abx at NH (azithromycin and 1 other medication without improved). +non-productive cough, chills, post-tussive emesis x1 and pleuritic chest discomfort. No fevers, abd pain, diarrhea, or dysuria. Notes swelling of b/l LE, no hx of DVT/PE.

## 2017-10-05 NOTE — H&P ADULT - ATTENDING COMMENTS
Patient is a 63 yo female NH resident with a medical hx notable for HTN, COPD, and GERD, and unspecified psychiatric disorder presenting with SOB and productive cough with brown sputum. No fever or chills. No sneezing, rhinorrhea. Denies chest pain. Has poor exercise tolerance at baseline. Patient reports unable to ambulate due to chronic weakness. +heartburn.    Patient seen and examined at beside. Afebrile, VS stable/WNL. Exam notable for coughing fits. RRR, no m/r/g. Lungs - transmitted upper airway sounds. No crackles or wheezes on exam. Abd - soft, NT, ND. LE with non-pitting edema, b/l erythema not warm to touch. Sensation to light touch intact. Motor function grossly intact but pt unable to lift legs bilaterally against gravity.    Labs: CBC WNL. Normal coag studies. Hypokalemia - K 2.9, but remainder of labs WNL. CXR negative for consolidation, pleural effusions, or pneumothorax. LE venous dopplers negative for DVT    #SOB/Cough  Possible COPD exacerbation vs. poorly controlled dyspepsia vs. viral syndrome  Patient has reported hx of COPD but per patient not an extensive smoking hx that would be c/w COPD and not on chronic therapy for COPD. Unclear if pt with PFTs to confirm dx of COPD. Not hypoxic. No wheezes on exam but pt given nebulizer treatments prior to exam. Cough could be 2/2 acid reflux vs. post-nasal drip. Low suspicion for bacterial PNA - CXR w/o consolidation, afebrile, and no leukocytosis. Possible viral syndrome and will continue to treat symptomatically  - refer to outpatient pulmonary function studies  - treat for possible post-nasal drip with intranasal steroids  - continue PPI and home dosing of Carafate. Pt will likely need outpatient GI due to chronicity of dyspepsia  - cough suppressant with guaifenesin/dextromethorphan PRN  - if wheezing, albuterol nebs PRN    #Hypokalemia  Suspect medication induced since pt given multiple albuterol nebulizer treatments  - replete potassium   - repeat BMP    #LE swelling/edema  Suspect 2/2 chronic venous stasis. DVT r/o by negative LE venous dopplers. Normal WBC, afebrile and not warm to touch - low suspicion for active infection, such as cellulitis  - no need for abx at this time

## 2017-10-05 NOTE — H&P ADULT - PROBLEM SELECTOR PLAN 4
Stable at this time  - Will do med rec and determine home meds Stable at this time  - Will do med rec and determine home meds, continue with home meds

## 2017-10-05 NOTE — H&P ADULT - ASSESSMENT
62F with COPD, HTN, GERD presenting from a nursing home with 2 weeks of shortness of breath and cough productive of sputum likely secondary to pneumonia vs COPD exacerbation. Found to have b/l lower extremity tenderness, nonpitting edema, and erythema. US did not show evidence of DVT. Possible secondary to cellulitis. Mild abdominal tenderness likely secondary to constipation in setting of lack of ambulation.

## 2017-10-05 NOTE — ED PROVIDER NOTE - CARE PLAN
Principal Discharge DX:	HCAP (healthcare-associated pneumonia)  Secondary Diagnosis:	COPD exacerbation

## 2017-10-05 NOTE — ED ADULT NURSE REASSESSMENT NOTE - NS ED NURSE REASSESS COMMENT FT1
pt to be admitted to the hospital. pt started on Potassium replacement 2nd Potassium Paul infusing. pt to get antibiotics. pt has one IV access, Dr. Figueroa aware and started ok to give antibiotics after all potassium infusions are completed. pt had Xray of chest completed. pt in NAD at this time.

## 2017-10-05 NOTE — ED ADULT NURSE NOTE - OBJECTIVE STATEMENT
Covering RN: pt AO x3, unable to ambulate, from NH, c/o severe headache and cough with n/v x1(only yesterday) s/p pneumonia for 2 weeks. Also c/o SOB but VSS. NAD. PMH of COPD, Pneumonia, C-diff. Denies chest pain, dizziness, and actual vomiting. Pending evaluation by provider. Awaiting further study. will continue to monitor.

## 2017-10-05 NOTE — H&P ADULT - HISTORY OF PRESENT ILLNESS
62F with HTN, COPD, and GERD presenting for SOB and cough x2 weeks. The patient lives at [facility] where she says that everyone on her floor has been sick and coughing. She endorses shortness of breath and a persistent cough which became productive of copious brown sputum one week ago which has progressed to the point where she is no longer able to walk with her walker due to the coughing fits and shortness of breath. She also endorses chest pain associated with the coughing in her midsternum that does not radiate. She had a CXR done at the [facility] and they told her she had pneumonia and started her on a azithromycin and another antibiotic that she was unsure of. She endorses having had PNA 8 times in the past but never this bad, usually being treated with short courses of antibiotics. She does not require home oxygen therapy. She denies headache, abdominal pain, back pain. She endorses vomiting associated with coughing fits but no nausea. She endorses constipation and has urinary incontinence.    ED Course:  Vitals: 98.2 F, 90, 127/70, 24, 100% on Supplemental O2  Recieved 3 duonebs  Started on Azithromycin, vancomycin, and zosyn  Given 60mg prednisone  Had K+ repleted

## 2017-10-05 NOTE — H&P ADULT - NSHPSOCIALHISTORY_GEN_ALL_CORE
Lives at NH and ambulates with walker since knee replacement surgery because she is unsteady. Only family is cousin Gina who lives In [place] who she talks to daily. Gina is her proxy ([phone]). endorses having smoked 1 cigarette every few days for a year then quitting. Endorses history of alcoholism after her mother  in . Has been sober since . Denies drug use. Uses diapers for incontinence.

## 2017-10-05 NOTE — ED ADULT NURSE REASSESSMENT NOTE - NS ED NURSE REASSESS COMMENT FT1
pt refused blood works because she thinks she needs US for IV access due to hard stick. Also refused checking her back skin at this time but denies any pressure ulcer at this time.

## 2017-10-05 NOTE — ED PROVIDER NOTE - MEDICAL DECISION MAKING DETAILS
62F with SOB and cough. Concern for COPD exacerbation, PNA, ACS, and PE (wells score = 4.5 for immobilization and leg swelling). Plan: ekg, cxr, labs, duonebs, prednisone, abx, admit. 62F with SOB and cough. Concern for COPD exacerbation, PNA, ACS, and PE (wells score =3 for leg swelling. Plan: ekg, cxr, labs, duonebs, prednisone, abx, admit. Will get LE duplex and d-dimer, then CTPA if dimer positive.

## 2017-10-05 NOTE — H&P ADULT - PMH
Anxiety    Cellulitis of lower extremity, unspecified laterality    Chronic obstructive pulmonary disease, unspecified COPD type    Depression    Essential hypertension    GERD (gastroesophageal reflux disease)

## 2017-10-05 NOTE — H&P ADULT - NSHPLABSRESULTS_GEN_ALL_CORE
Comprehensive Metabolic Panel (10.05.17 @ 14:10)    Sodium, Serum: 142 mmol/L    Potassium, Serum: 2.9 mmol/L    Chloride, Serum: 99: Delta: 107 on 09/11/    Delta: 107 on 09/11/ mmol/L    Carbon Dioxide, Serum: 27 mmol/L    Blood Urea Nitrogen, Serum: 18 mg/dL    Creatinine, Serum: 1.23 mg/dL    Glucose, Serum: 106 mg/dL    Calcium, Total Serum: 9.8 mg/dL    Protein Total, Serum: 7.3 g/dL    Albumin, Serum: 4.4 g/dL    Bilirubin Total, Serum: 0.5 mg/dL    Alkaline Phosphatase, Serum: 116: Please note new reference ranges are adjusted for age and  gender. u/L    Aspartate Aminotransferase (AST/SGOT): 18 u/L    Alanine Aminotransferase (ALT/SGPT): 18 u/L    Complete Blood Count + Automated Diff (10.05.17 @ 14:10)    Nucleated RBC #: 0    WBC Count: 7.29 K/uL    RBC Count: 4.31 M/uL    Hemoglobin: 12.6 g/dL    Hematocrit: 39.6 %    Mean Cell Volume: 91.9 fL    Mean Cell Hemoglobin: 29.2 pg    Mean Cell Hemoglobin Conc: 31.8 %    Red Cell Distrib Width: 13.2 %    Platelet Count - Automated: 194 K/uL    MPV: 10.6 fl    Auto Neutrophil #: 4.14 K/uL    Auto Lymphocyte #: 2.24 K/uL    Auto Monocyte #: 0.57 K/uL    Auto Eosinophil #: 0.27 K/uL    Auto Basophil #: 0.04 K/uL    Auto Immature Granulocyte #: 0.03: (Includes meta, myelo and promyelocytes) #    Auto Neutrophil %: 56.9 %    Auto Lymphocyte %: 30.7 %    Auto Monocyte %: 7.8 %    Auto Eosinophil %: 3.7 %    Auto Basophil %: 0.5 %    Auto Immature Granulocyte %: 0.4: (Includes meta, myelo and promyelocytes) %      Troponin T, Serum (10.05.17 @ 14:10)    Troponin T, Serum: < 0.06: INCLUDES THE 99th PERCENTILE OF A HEALTHY  POPULATION AT A  METHOD C.V. OF 10% OR LESS.    CK Total and CKMB (10.05.17 @ 14:10)    Creatine Kinase, Serum: 140: CKMB is no longer reflexively performed on elevated CK  results.  To get CKMB results please order "CK AND CKMB".  Effective Kenia 15, 2016. u/L    CKMB: 1.94 ng/mL    CKMB Relative Index: Test not performed    D-Dimer Assay, Quantitative (10.05.17 @ 14:04)    D-Dimer Assay, Quantitative: 164: A result less than 230 ng/mL DDU correlates with the absence  of thrombosis in a patient with low and moderate pre-test  probability of thrombosis.    < from: Xray Chest 1 View AP/PA (10.05.17 @ 16:58) >    IMPRESSION:  Lungs underinflated.    Fine left basilar stranding opacities compatible with subsegmental   atelectasis. Clear remaining visualized lungs. No pleural effusions or   pneumothorax.    Cardiac external silhouettes inaccurately assessed on this projection but   appear grossly stable.    < end of copied text >    < from: US Duplex Venous Lower Ext Complete, Bilateral (10.05.17 @ 14:42) >    IMPRESSION:     No evidence of bilateral lower extremity deep venous thrombosis.

## 2017-10-05 NOTE — ED ADULT TRIAGE NOTE - CHIEF COMPLAINT QUOTE
states"  I have SOB and been treated with antibiotics for pneumonia since 2 weeks" denies cp/fever. h/o COPD, Pneumonia, C-diff

## 2017-10-06 ENCOUNTER — TRANSCRIPTION ENCOUNTER (OUTPATIENT)
Age: 62
End: 2017-10-06

## 2017-10-06 VITALS
DIASTOLIC BLOOD PRESSURE: 85 MMHG | RESPIRATION RATE: 18 BRPM | HEART RATE: 89 BPM | OXYGEN SATURATION: 95 % | SYSTOLIC BLOOD PRESSURE: 141 MMHG | TEMPERATURE: 98 F

## 2017-10-06 DIAGNOSIS — J44.1 CHRONIC OBSTRUCTIVE PULMONARY DISEASE WITH (ACUTE) EXACERBATION: ICD-10-CM

## 2017-10-06 DIAGNOSIS — Z29.9 ENCOUNTER FOR PROPHYLACTIC MEASURES, UNSPECIFIED: ICD-10-CM

## 2017-10-06 LAB
ALBUMIN SERPL ELPH-MCNC: 3.9 G/DL — SIGNIFICANT CHANGE UP (ref 3.3–5)
ALP SERPL-CCNC: 99 U/L — SIGNIFICANT CHANGE UP (ref 40–120)
ALT FLD-CCNC: 15 U/L — SIGNIFICANT CHANGE UP (ref 4–33)
AST SERPL-CCNC: 17 U/L — SIGNIFICANT CHANGE UP (ref 4–32)
BASOPHILS # BLD AUTO: 0.02 K/UL — SIGNIFICANT CHANGE UP (ref 0–0.2)
BASOPHILS NFR BLD AUTO: 0.2 % — SIGNIFICANT CHANGE UP (ref 0–2)
BILIRUB SERPL-MCNC: 0.2 MG/DL — SIGNIFICANT CHANGE UP (ref 0.2–1.2)
BUN SERPL-MCNC: 19 MG/DL — SIGNIFICANT CHANGE UP (ref 7–23)
CALCIUM SERPL-MCNC: 9.2 MG/DL — SIGNIFICANT CHANGE UP (ref 8.4–10.5)
CHLORIDE SERPL-SCNC: 104 MMOL/L — SIGNIFICANT CHANGE UP (ref 98–107)
CO2 SERPL-SCNC: 24 MMOL/L — SIGNIFICANT CHANGE UP (ref 22–31)
CREAT SERPL-MCNC: 1.11 MG/DL — SIGNIFICANT CHANGE UP (ref 0.5–1.3)
EOSINOPHIL # BLD AUTO: 0.01 K/UL — SIGNIFICANT CHANGE UP (ref 0–0.5)
EOSINOPHIL NFR BLD AUTO: 0.1 % — SIGNIFICANT CHANGE UP (ref 0–6)
GLUCOSE SERPL-MCNC: 129 MG/DL — HIGH (ref 70–99)
HCT VFR BLD CALC: 36 % — SIGNIFICANT CHANGE UP (ref 34.5–45)
HGB BLD-MCNC: 11.1 G/DL — LOW (ref 11.5–15.5)
IMM GRANULOCYTES # BLD AUTO: 0.03 # — SIGNIFICANT CHANGE UP
IMM GRANULOCYTES NFR BLD AUTO: 0.3 % — SIGNIFICANT CHANGE UP (ref 0–1.5)
LYMPHOCYTES # BLD AUTO: 1.53 K/UL — SIGNIFICANT CHANGE UP (ref 1–3.3)
LYMPHOCYTES # BLD AUTO: 14.7 % — SIGNIFICANT CHANGE UP (ref 13–44)
MAGNESIUM SERPL-MCNC: 2 MG/DL — SIGNIFICANT CHANGE UP (ref 1.6–2.6)
MCHC RBC-ENTMCNC: 28.3 PG — SIGNIFICANT CHANGE UP (ref 27–34)
MCHC RBC-ENTMCNC: 30.8 % — LOW (ref 32–36)
MCV RBC AUTO: 91.8 FL — SIGNIFICANT CHANGE UP (ref 80–100)
MONOCYTES # BLD AUTO: 0.62 K/UL — SIGNIFICANT CHANGE UP (ref 0–0.9)
MONOCYTES NFR BLD AUTO: 5.9 % — SIGNIFICANT CHANGE UP (ref 2–14)
NEUTROPHILS # BLD AUTO: 8.22 K/UL — HIGH (ref 1.8–7.4)
NEUTROPHILS NFR BLD AUTO: 78.8 % — HIGH (ref 43–77)
NRBC # FLD: 0 — SIGNIFICANT CHANGE UP
PHOSPHATE SERPL-MCNC: 2.6 MG/DL — SIGNIFICANT CHANGE UP (ref 2.5–4.5)
PLATELET # BLD AUTO: 208 K/UL — SIGNIFICANT CHANGE UP (ref 150–400)
PMV BLD: 10.5 FL — SIGNIFICANT CHANGE UP (ref 7–13)
POTASSIUM SERPL-MCNC: 3.7 MMOL/L — SIGNIFICANT CHANGE UP (ref 3.5–5.3)
POTASSIUM SERPL-SCNC: 3.7 MMOL/L — SIGNIFICANT CHANGE UP (ref 3.5–5.3)
PROT SERPL-MCNC: 6.2 G/DL — SIGNIFICANT CHANGE UP (ref 6–8.3)
RBC # BLD: 3.92 M/UL — SIGNIFICANT CHANGE UP (ref 3.8–5.2)
RBC # FLD: 13.2 % — SIGNIFICANT CHANGE UP (ref 10.3–14.5)
SODIUM SERPL-SCNC: 142 MMOL/L — SIGNIFICANT CHANGE UP (ref 135–145)
WBC # BLD: 10.43 K/UL — SIGNIFICANT CHANGE UP (ref 3.8–10.5)
WBC # FLD AUTO: 10.43 K/UL — SIGNIFICANT CHANGE UP (ref 3.8–10.5)

## 2017-10-06 PROCEDURE — 99239 HOSP IP/OBS DSCHRG MGMT >30: CPT

## 2017-10-06 RX ORDER — FLUTICASONE PROPIONATE 50 MCG
1 SPRAY, SUSPENSION NASAL
Qty: 1 | Refills: 0 | OUTPATIENT
Start: 2017-10-06 | End: 2017-11-05

## 2017-10-06 RX ORDER — ACETAMINOPHEN 500 MG
1 TABLET ORAL
Qty: 0 | Refills: 0 | COMMUNITY

## 2017-10-06 RX ORDER — CITALOPRAM 10 MG/1
1 TABLET, FILM COATED ORAL
Qty: 0 | Refills: 0 | COMMUNITY

## 2017-10-06 RX ORDER — BUMETANIDE 0.25 MG/ML
1 INJECTION INTRAMUSCULAR; INTRAVENOUS
Qty: 0 | Refills: 0 | COMMUNITY

## 2017-10-06 RX ORDER — POTASSIUM CHLORIDE 20 MEQ
10 PACKET (EA) ORAL
Qty: 0 | Refills: 0 | Status: COMPLETED | OUTPATIENT
Start: 2017-10-06 | End: 2017-10-06

## 2017-10-06 RX ORDER — ARIPIPRAZOLE 15 MG/1
1 TABLET ORAL
Qty: 0 | Refills: 0 | COMMUNITY

## 2017-10-06 RX ORDER — ACETAMINOPHEN 500 MG
650 TABLET ORAL EVERY 6 HOURS
Qty: 0 | Refills: 0 | Status: DISCONTINUED | OUTPATIENT
Start: 2017-10-06 | End: 2017-10-06

## 2017-10-06 RX ORDER — METHOCARBAMOL 500 MG/1
1 TABLET, FILM COATED ORAL
Qty: 0 | Refills: 0 | COMMUNITY

## 2017-10-06 RX ORDER — TRAMADOL HYDROCHLORIDE 50 MG/1
1 TABLET ORAL
Qty: 0 | Refills: 0 | COMMUNITY

## 2017-10-06 RX ORDER — ONDANSETRON 8 MG/1
4 TABLET, FILM COATED ORAL ONCE
Qty: 0 | Refills: 0 | Status: COMPLETED | OUTPATIENT
Start: 2017-10-06 | End: 2017-10-06

## 2017-10-06 RX ORDER — TOPIRAMATE 25 MG
1 TABLET ORAL
Qty: 0 | Refills: 0 | COMMUNITY

## 2017-10-06 RX ADMIN — Medication 1 GRAM(S): at 11:49

## 2017-10-06 RX ADMIN — Medication 1 GRAM(S): at 16:04

## 2017-10-06 RX ADMIN — Medication 100 MILLIGRAM(S): at 11:49

## 2017-10-06 RX ADMIN — Medication 100 MILLIEQUIVALENT(S): at 05:18

## 2017-10-06 RX ADMIN — Medication 650 MILLIGRAM(S): at 09:15

## 2017-10-06 RX ADMIN — Medication 300 MILLIGRAM(S): at 00:59

## 2017-10-06 RX ADMIN — Medication 650 MILLIGRAM(S): at 10:45

## 2017-10-06 RX ADMIN — Medication 5 MILLIGRAM(S): at 05:17

## 2017-10-06 RX ADMIN — BUDESONIDE AND FORMOTEROL FUMARATE DIHYDRATE 2 PUFF(S): 160; 4.5 AEROSOL RESPIRATORY (INHALATION) at 08:49

## 2017-10-06 RX ADMIN — Medication 1 GRAM(S): at 05:17

## 2017-10-06 RX ADMIN — FAMOTIDINE 20 MILLIGRAM(S): 10 INJECTION INTRAVENOUS at 11:49

## 2017-10-06 RX ADMIN — Medication 3 MILLILITER(S): at 04:34

## 2017-10-06 RX ADMIN — Medication 60 MILLIGRAM(S): at 05:17

## 2017-10-06 RX ADMIN — Medication 3 MILLILITER(S): at 11:23

## 2017-10-06 RX ADMIN — Medication 100 MILLIGRAM(S): at 05:17

## 2017-10-06 RX ADMIN — HEPARIN SODIUM 5000 UNIT(S): 5000 INJECTION INTRAVENOUS; SUBCUTANEOUS at 14:35

## 2017-10-06 RX ADMIN — ONDANSETRON 4 MILLIGRAM(S): 8 TABLET, FILM COATED ORAL at 02:31

## 2017-10-06 RX ADMIN — HEPARIN SODIUM 5000 UNIT(S): 5000 INJECTION INTRAVENOUS; SUBCUTANEOUS at 05:17

## 2017-10-06 NOTE — DISCHARGE NOTE ADULT - CARE PROVIDER_API CALL
Donaldo Stokes  [address] [place] [address]  Phone: [phone]  Fax: (   )    -

## 2017-10-06 NOTE — PROGRESS NOTE ADULT - ASSESSMENT
62F with COPD, HTN, GERD, and anxiety presenting with 2 weeks of cough and SOB likely secondary to COPD exacerbation.

## 2017-10-06 NOTE — PROGRESS NOTE ADULT - PROBLEM SELECTOR PLAN 1
Cynthia continuing to have coughing fits but stable SpO2 on RA.  - Day 3 of levoquin tonight, will continue 5 day course  - Day 2 of prednisone this morning, will continue 5 day course

## 2017-10-06 NOTE — PROGRESS NOTE ADULT - SUBJECTIVE AND OBJECTIVE BOX
Overnight placed on telemetry for low K of 2.9 in ED. K repleted overnight to 3.7 by 02:00. Patient reported multiple coughing fits overnight and episodes of vomiting after eating from coughing fits. Denies any nausea, abdominal pain. Endorses feeling warm and endorses b/l leg pain which is improving. Endorses chest pain with coughing and difficulty breathing. Patient very anxious. Did not require O2 overnight.    ICU Vital Signs Last 24 Hrs  T(C): 36.8 (06 Oct 2017 05:16), Max: 36.8 (05 Oct 2017 12:08)  T(F): 98.3 (06 Oct 2017 05:16), Max: 98.3 (05 Oct 2017 15:06)  HR: 104 (06 Oct 2017 05:16) (85 - 104)  BP: 114/63 (06 Oct 2017 05:16) (111/88 - 137/63)  RR: 19 (06 Oct 2017 05:16) (18 - 24)  SpO2: 96% (06 Oct 2017 05:16) (96% - 99%) on RA                          11.1   10.43 )-----------( 208      ( 06 Oct 2017 02:40 )             36.0       142  |  104  |  19  ----------------------------<  129<H>  3.7   |  24  |  1.11    Ca    9.2      06 Oct 2017 02:40  Phos  2.6     10-06  Mg     2.0     10-06    TPro  6.2  /  Alb  3.9  /  TBili  0.2  /  DBili  x   /  AST  17  /  ALT  15  /  AlkPhos  99  10-06

## 2017-10-06 NOTE — DISCHARGE NOTE ADULT - ADDITIONAL INSTRUCTIONS
See above changes to medications. Make an appointment with your primary care doctor within 1 week to discuss your stay here and the problems listed above.

## 2017-10-06 NOTE — DISCHARGE NOTE ADULT - CARE PLAN
Principal Discharge DX:	Cough  Goal:	Reduce cough symptoms  Instructions for follow-up, activity and diet:	Cough may be related to post nasal drip, COPD, or to GERD. We increased your famotidine to help with the reflux and added a fluticasone inhaler to help with the nasal drip. Make sure to see your doctor within a 5-7 days. Consider adding a proton pump inhibitor if symptoms do not resolve.  Secondary Diagnosis:	Chronic obstructive pulmonary disease, unspecified COPD type  Goal:	Manage symptoms and get reevaluated.  Instructions for follow-up, activity and diet:	Continue your albuterol inhaler as needed and follow up with your doctor for further management and diagnosis. Consider seeing a pulmonologist to have pulmonary function testing for consideration of appropriate management.  Secondary Diagnosis:	GERD (gastroesophageal reflux disease)  Goal:	Manage cough and vomiting symptoms  Instructions for follow-up, activity and diet:	Increased famotidine to twice daily 20mg. Be sure to follow up with your doctor in 5-7 days.  Secondary Diagnosis:	Anxiety  Goal:	Maintain control of anxious symptoms  Instructions for follow-up, activity and diet:	Recommend follow up with doctor to discuss potential for medication to help control anxiety if needed.  Secondary Diagnosis:	Essential hypertension  Goal:	Maintain normal blood pressures  Instructions for follow-up, activity and diet:	Continue with home enalapril. Follow up with your doctor within 5-7 days.  Secondary Diagnosis:	Swelling of both lower extremities  Goal:	Reduce swelling  Instructions for follow-up, activity and diet:	Treated with full course of antibiotics for possible cellulitis. May also be related to venous stasis. Be sure to follow up with doctor within 5-7 days.

## 2017-10-06 NOTE — DISCHARGE NOTE ADULT - MEDICATION SUMMARY - MEDICATIONS TO STOP TAKING
I will STOP taking the medications listed below when I get home from the hospital:    ARIPiprazole 2 mg oral tablet  -- 1 tab(s) by mouth once a day    citalopram 20 mg oral tablet  -- 1 tab(s) by mouth once a day    traMADol 50 mg oral tablet  -- 1 tab(s) by mouth every 4 hours, As Needed    pantoprazole 40 mg oral delayed release tablet  -- 1 tab(s) by mouth once a day (before a meal)    Azithromycin 5 Day Dose Pack 250 mg oral tablet  -- Take by mouth as directed    Levaquin 500 mg oral tablet  -- 1 tab(s) by mouth every 24 hours    bumetanide 1 mg oral tablet  -- 1 tab(s) by mouth once a day I will STOP taking the medications listed below when I get home from the hospital:    acetaminophen 650 mg oral tablet  -- 1 tab(s) by mouth every 6 hours, As Needed    ARIPiprazole 2 mg oral tablet  -- 1 tab(s) by mouth once a day    citalopram 20 mg oral tablet  -- 1 tab(s) by mouth once a day    methocarbamol 500 mg oral tablet  -- 1 tab(s) by mouth 4 times a day    traMADol 50 mg oral tablet  -- 1 tab(s) by mouth every 4 hours, As Needed    pantoprazole 40 mg oral delayed release tablet  -- 1 tab(s) by mouth once a day (before a meal)    Azithromycin 5 Day Dose Pack 250 mg oral tablet  -- Take by mouth as directed    Levaquin 500 mg oral tablet  -- 1 tab(s) by mouth every 24 hours    bumetanide 1 mg oral tablet  -- 1 tab(s) by mouth once a day

## 2017-10-06 NOTE — PROGRESS NOTE ADULT - PROBLEM SELECTOR PLAN 2
Improved swelling and erythema from yesterday.  - C/w levoquin to cover possible cellulitis vs erysipelas

## 2017-10-06 NOTE — DISCHARGE NOTE ADULT - PLAN OF CARE
Reduce cough symptoms Cough may be related to post nasal drip, COPD, or to GERD. We increased your famotidine to help with the reflux and added a fluticasone inhaler to help with the nasal drip. Make sure to see your doctor within a 5-7 days. Consider adding a proton pump inhibitor if symptoms do not resolve. Manage symptoms and get reevaluated. Continue your albuterol inhaler as needed and follow up with your doctor for further management and diagnosis. Consider seeing a pulmonologist to have pulmonary function testing for consideration of appropriate management. Manage cough and vomiting symptoms Increased famotidine to twice daily 20mg. Be sure to follow up with your doctor in 5-7 days. Maintain control of anxious symptoms Recommend follow up with doctor to discuss potential for medication to help control anxiety if needed. Maintain normal blood pressures Continue with home enalapril. Follow up with your doctor within 5-7 days. Reduce swelling Treated with full course of antibiotics for possible cellulitis. May also be related to venous stasis. Be sure to follow up with doctor within 5-7 days.

## 2017-10-06 NOTE — PROGRESS NOTE ADULT - ATTENDING COMMENTS
Patient is a 61 yo female NH resident with a medical hx notable for HTN, COPD, and GERD, and unspecified psychiatric disorder presenting with SOB and productive cough with brown sputum. No fever or chills. No sneezing, rhinorrhea. Denies chest pain. Has poor exercise tolerance at baseline. Patient reports unable to ambulate due to chronic weakness. +heartburn.    Patient reports she does not want to go back to her NH residence today because her co-residents do not like her. Patient also unable to recall when diagnosed with COPD and denies hx of asthma. Suspect possible behavorial component to coughing spells as well.    Patient seen and examined at beside. Afebrile, VS stable/WNL. Patient w/o coughing spells when having discussion. However, when stethoscope used for heart and lung exam, pt noted to have very forceful exhalation and coughing until exam completed. RRR, no m/r/g. Lungs - transmitted upper airway sounds. No crackles or wheezes on exam. Abd - soft, NT, ND. LE with non-pitting edema, b/l erythema not warm to touch.       #SOB/Cough  Will treat for poorly controlled dyspepsia and possible post-nasal drip contributing to sx's. Low suspicion for COPD diagnosis or acute exacerbation  - recommend outpatient pulmonary function studies  - intranasal steroids (such as fluticasone) x2 weeks  - famotidine 20mg BID x6 weeks. Can titrate up to 40mg BID if necessary. Pt will likely need outpatient GI due to chronicity of dyspepsia  - cough suppressant with guaifenesin/dextromethorphan PRN    #Hypokalemia - resolved    #LE swelling/edema  Suspect 2/2 chronic venous stasis.   - recommend compression stockings and LE elevation

## 2017-10-06 NOTE — DISCHARGE NOTE ADULT - PATIENT PORTAL LINK FT
“You can access the FollowHealth Patient Portal, offered by [facility], by registering with the following website: [email or web address]”

## 2017-10-06 NOTE — DISCHARGE NOTE ADULT - MEDICATION SUMMARY - MEDICATIONS TO TAKE
I will START or STAY ON the medications listed below when I get home from the hospital:    enalapril 5 mg oral tablet  -- 1 tab(s) by mouth once a day  -- Indication: For Blood Pressure    aluminum hydroxide-magnesium hydroxide 200 mg-200 mg/5 mL oral suspension  -- 30 milliliter(s) by mouth every 6 hours, As needed, Dyspepsia  -- Indication: For Dyspepsia    topiramate 50 mg oral tablet  -- 1 tab(s) by mouth once a day (at bedtime)  -- Indication: For Anxiety    traZODone 300 mg oral tablet  -- 1 tab(s) by mouth once a day (at bedtime)  -- Indication: For Sleep    simvastatin 40 mg oral tablet  -- 1 tab(s) by mouth once a day (at bedtime)  -- Indication: For Cholesterol    Symbicort 160 mcg-4.5 mcg/inh inhalation aerosol  -- 1 puff(s) inhaled 2 times a day  -- Indication: For COPD    albuterol 2.5 mg/3 mL (0.083%) inhalation solution  -- 3 milliliter(s) inhaled every 4 hours  -- Indication: For COPD    famotidine 20 mg oral tablet  -- 1 tab(s) by mouth 2 times a day  -- Indication: For GERD (gastroesophageal reflux disease)    Senna 8.6 mg oral tablet  -- 1 tab(s) by mouth once a day (at bedtime)  -- Indication: For Constipation    lactulose 10 g/15 mL oral syrup  -- 30 milliliter(s) by mouth every 8 hours, As needed, Constipation  -- Indication: For Constipation    Carafate 1 g/10 mL oral suspension  -- 10 milliliter(s) by mouth 4 times a day (before meals and at bedtime)  -- Indication: For Dyspepsia    fluticasone 27.5 mcg/inh nasal spray  -- 2 spray(s) per nostril once a day  -- Indication: For Cough    Melatonin 3 mg oral tablet  -- 1 tab(s) by mouth once (at bedtime)  -- Indication: For Sleep    Promethazine DM 6.25 mg-15 mg/5 mL oral syrup  -- 10 milliliter(s) by mouth every 8 hours, As Needed  -- Indication: For Cough    thiamine 100 mg oral tablet  -- 1 tab(s) by mouth once a day  -- Indication: For Vitamin

## 2017-10-06 NOTE — DISCHARGE NOTE ADULT - SECONDARY DIAGNOSIS.
Chronic obstructive pulmonary disease, unspecified COPD type GERD (gastroesophageal reflux disease) Anxiety Essential hypertension Swelling of both lower extremities

## 2017-10-06 NOTE — DISCHARGE NOTE ADULT - PROVIDER TOKENS
FREE:[LAST:[Kike],FIRST:[Donaldo],PHONE:[phone]],FAX:[(   )    -],ADDRESS:[[address] [place] [address]]]

## 2017-10-06 NOTE — DISCHARGE NOTE ADULT - HOSPITAL COURSE
Presented to ED from [facility] with cough and SOB x2 weeks after having already received 1 day or treatment with levoquin and 2 of azithromycin. Received another dose of levoquin in ED and 2 total doses of 60mg prednisone. Received multiple duonebs and admitted to medicine. Patient also received 9 days of treatment with cephalexin at nursing home. Cough due to mild COPD exacerbation vs undertreated GERD and irritation from reflux. Discharged on increased dose of famotidine to therapeutic twice daily 20 mg. Continued on current meds as reconciled from NH.

## 2017-10-13 ENCOUNTER — EMERGENCY (EMERGENCY)
Facility: HOSPITAL | Age: 62
LOS: 1 days | Discharge: HOME | End: 2017-10-13
Attending: EMERGENCY MEDICINE | Admitting: EMERGENCY MEDICINE
Payer: MEDICAID

## 2017-10-13 VITALS
HEART RATE: 80 BPM | DIASTOLIC BLOOD PRESSURE: 75 MMHG | OXYGEN SATURATION: 99 % | SYSTOLIC BLOOD PRESSURE: 131 MMHG | RESPIRATION RATE: 16 BRPM | TEMPERATURE: 98 F

## 2017-10-13 VITALS
DIASTOLIC BLOOD PRESSURE: 86 MMHG | HEART RATE: 89 BPM | SYSTOLIC BLOOD PRESSURE: 169 MMHG | RESPIRATION RATE: 20 BRPM | OXYGEN SATURATION: 100 %

## 2017-10-13 DIAGNOSIS — Z96.659 PRESENCE OF UNSPECIFIED ARTIFICIAL KNEE JOINT: Chronic | ICD-10-CM

## 2017-10-13 LAB
ALBUMIN SERPL ELPH-MCNC: 4 G/DL — SIGNIFICANT CHANGE UP (ref 3.3–5)
ALP SERPL-CCNC: 88 U/L — SIGNIFICANT CHANGE UP (ref 40–120)
ALT FLD-CCNC: 14 U/L — SIGNIFICANT CHANGE UP (ref 4–33)
APPEARANCE UR: CLEAR — SIGNIFICANT CHANGE UP
AST SERPL-CCNC: 13 U/L — SIGNIFICANT CHANGE UP (ref 4–32)
BASE EXCESS BLDV CALC-SCNC: -1.6 MMOL/L — SIGNIFICANT CHANGE UP
BASOPHILS # BLD AUTO: 0.05 K/UL — SIGNIFICANT CHANGE UP (ref 0–0.2)
BASOPHILS NFR BLD AUTO: 0.7 % — SIGNIFICANT CHANGE UP (ref 0–2)
BILIRUB SERPL-MCNC: 0.2 MG/DL — SIGNIFICANT CHANGE UP (ref 0.2–1.2)
BILIRUB UR-MCNC: NEGATIVE — SIGNIFICANT CHANGE UP
BLOOD GAS VENOUS - CREATININE: 1.32 MG/DL — HIGH (ref 0.5–1.3)
BLOOD UR QL VISUAL: NEGATIVE — SIGNIFICANT CHANGE UP
BUN SERPL-MCNC: 19 MG/DL — SIGNIFICANT CHANGE UP (ref 7–23)
CALCIUM SERPL-MCNC: 9.2 MG/DL — SIGNIFICANT CHANGE UP (ref 8.4–10.5)
CHLORIDE BLDV-SCNC: 111 MMOL/L — HIGH (ref 96–108)
CHLORIDE SERPL-SCNC: 109 MMOL/L — HIGH (ref 98–107)
CK MB BLD-MCNC: 2.85 NG/ML — SIGNIFICANT CHANGE UP (ref 1–4.7)
CK SERPL-CCNC: 96 U/L — SIGNIFICANT CHANGE UP (ref 25–170)
CO2 SERPL-SCNC: 23 MMOL/L — SIGNIFICANT CHANGE UP (ref 22–31)
COLOR SPEC: SIGNIFICANT CHANGE UP
CREAT SERPL-MCNC: 1.25 MG/DL — SIGNIFICANT CHANGE UP (ref 0.5–1.3)
EOSINOPHIL # BLD AUTO: 0.34 K/UL — SIGNIFICANT CHANGE UP (ref 0–0.5)
EOSINOPHIL NFR BLD AUTO: 4.5 % — SIGNIFICANT CHANGE UP (ref 0–6)
GAS PNL BLDV: 140 MMOL/L — SIGNIFICANT CHANGE UP (ref 136–146)
GLUCOSE BLDV-MCNC: 106 — HIGH (ref 70–99)
GLUCOSE SERPL-MCNC: 102 MG/DL — HIGH (ref 70–99)
GLUCOSE UR-MCNC: NEGATIVE — SIGNIFICANT CHANGE UP
HCO3 BLDV-SCNC: 22 MMOL/L — SIGNIFICANT CHANGE UP (ref 20–27)
HCT VFR BLD CALC: 36.9 % — SIGNIFICANT CHANGE UP (ref 34.5–45)
HCT VFR BLDV CALC: 35.2 % — SIGNIFICANT CHANGE UP (ref 34.5–45)
HGB BLD-MCNC: 11.1 G/DL — LOW (ref 11.5–15.5)
HGB BLDV-MCNC: 11.4 G/DL — LOW (ref 11.5–15.5)
IMM GRANULOCYTES # BLD AUTO: 0.02 # — SIGNIFICANT CHANGE UP
IMM GRANULOCYTES NFR BLD AUTO: 0.3 % — SIGNIFICANT CHANGE UP (ref 0–1.5)
KETONES UR-MCNC: NEGATIVE — SIGNIFICANT CHANGE UP
LACTATE BLDV-MCNC: 1.4 MMOL/L — SIGNIFICANT CHANGE UP (ref 0.5–2)
LEUKOCYTE ESTERASE UR-ACNC: NEGATIVE — SIGNIFICANT CHANGE UP
LYMPHOCYTES # BLD AUTO: 2.44 K/UL — SIGNIFICANT CHANGE UP (ref 1–3.3)
LYMPHOCYTES # BLD AUTO: 32 % — SIGNIFICANT CHANGE UP (ref 13–44)
MCHC RBC-ENTMCNC: 28.1 PG — SIGNIFICANT CHANGE UP (ref 27–34)
MCHC RBC-ENTMCNC: 30.1 % — LOW (ref 32–36)
MCV RBC AUTO: 93.4 FL — SIGNIFICANT CHANGE UP (ref 80–100)
MONOCYTES # BLD AUTO: 0.63 K/UL — SIGNIFICANT CHANGE UP (ref 0–0.9)
MONOCYTES NFR BLD AUTO: 8.3 % — SIGNIFICANT CHANGE UP (ref 2–14)
NEUTROPHILS # BLD AUTO: 4.15 K/UL — SIGNIFICANT CHANGE UP (ref 1.8–7.4)
NEUTROPHILS NFR BLD AUTO: 54.2 % — SIGNIFICANT CHANGE UP (ref 43–77)
NITRITE UR-MCNC: NEGATIVE — SIGNIFICANT CHANGE UP
NRBC # FLD: 0 — SIGNIFICANT CHANGE UP
PCO2 BLDV: 47 MMHG — SIGNIFICANT CHANGE UP (ref 41–51)
PH BLDV: 7.32 PH — SIGNIFICANT CHANGE UP (ref 7.32–7.43)
PH UR: 6.5 — SIGNIFICANT CHANGE UP (ref 4.6–8)
PLATELET # BLD AUTO: 236 K/UL — SIGNIFICANT CHANGE UP (ref 150–400)
PMV BLD: 10.4 FL — SIGNIFICANT CHANGE UP (ref 7–13)
PO2 BLDV: 33 MMHG — LOW (ref 35–40)
POTASSIUM BLDV-SCNC: 3.3 MMOL/L — LOW (ref 3.4–4.5)
POTASSIUM SERPL-MCNC: 3.3 MMOL/L — LOW (ref 3.5–5.3)
POTASSIUM SERPL-SCNC: 3.3 MMOL/L — LOW (ref 3.5–5.3)
PROT SERPL-MCNC: 6.2 G/DL — SIGNIFICANT CHANGE UP (ref 6–8.3)
PROT UR-MCNC: NEGATIVE — SIGNIFICANT CHANGE UP
RBC # BLD: 3.95 M/UL — SIGNIFICANT CHANGE UP (ref 3.8–5.2)
RBC # FLD: 13.5 % — SIGNIFICANT CHANGE UP (ref 10.3–14.5)
RBC CASTS # UR COMP ASSIST: SIGNIFICANT CHANGE UP (ref 0–?)
SAO2 % BLDV: 55 % — LOW (ref 60–85)
SODIUM SERPL-SCNC: 146 MMOL/L — HIGH (ref 135–145)
SP GR SPEC: 1.01 — SIGNIFICANT CHANGE UP (ref 1–1.03)
SQUAMOUS # UR AUTO: SIGNIFICANT CHANGE UP
TROPONIN T SERPL-MCNC: < 0.06 NG/ML — SIGNIFICANT CHANGE UP (ref 0–0.06)
UROBILINOGEN FLD QL: NORMAL E.U. — SIGNIFICANT CHANGE UP (ref 0.1–0.2)
WBC # BLD: 7.63 K/UL — SIGNIFICANT CHANGE UP (ref 3.8–10.5)
WBC # FLD AUTO: 7.63 K/UL — SIGNIFICANT CHANGE UP (ref 3.8–10.5)

## 2017-10-13 PROCEDURE — 99285 EMERGENCY DEPT VISIT HI MDM: CPT | Mod: 25

## 2017-10-13 PROCEDURE — 93010 ELECTROCARDIOGRAM REPORT: CPT | Mod: 59

## 2017-10-13 PROCEDURE — 71275 CT ANGIOGRAPHY CHEST: CPT | Mod: 26

## 2017-10-13 RX ORDER — FAMOTIDINE 10 MG/ML
20 INJECTION INTRAVENOUS ONCE
Qty: 0 | Refills: 0 | Status: COMPLETED | OUTPATIENT
Start: 2017-10-13 | End: 2017-10-13

## 2017-10-13 RX ORDER — SODIUM CHLORIDE 9 MG/ML
1000 INJECTION, SOLUTION INTRAVENOUS ONCE
Qty: 0 | Refills: 0 | Status: COMPLETED | OUTPATIENT
Start: 2017-10-13 | End: 2017-10-13

## 2017-10-13 RX ORDER — POTASSIUM CHLORIDE 20 MEQ
40 PACKET (EA) ORAL ONCE
Qty: 0 | Refills: 0 | Status: COMPLETED | OUTPATIENT
Start: 2017-10-13 | End: 2017-10-13

## 2017-10-13 RX ORDER — POTASSIUM CHLORIDE 20 MEQ
40 PACKET (EA) ORAL ONCE
Qty: 0 | Refills: 0 | Status: DISCONTINUED | OUTPATIENT
Start: 2017-10-13 | End: 2017-10-13

## 2017-10-13 RX ADMIN — SODIUM CHLORIDE 2000 MILLILITER(S): 9 INJECTION, SOLUTION INTRAVENOUS at 10:13

## 2017-10-13 RX ADMIN — FAMOTIDINE 20 MILLIGRAM(S): 10 INJECTION INTRAVENOUS at 10:13

## 2017-10-13 RX ADMIN — Medication 30 MILLILITER(S): at 09:57

## 2017-10-13 RX ADMIN — Medication 40 MILLIEQUIVALENT(S): at 11:21

## 2017-10-13 NOTE — ED PROVIDER NOTE - MEDICAL DECISION MAKING DETAILS
62yof w/ multiple visits for recurrent chest pain, extensive work up performed within the last month with the exception of CT chest. Given pt's chronic cough, recurring chest pain, shortness of breath, will get CTA chest to eval for PE or occult infiltrates. ECG unchanged from prior, not likely ischemic pain given recent clean cath. Basic labs, enzymes.

## 2017-10-13 NOTE — ED PROVIDER NOTE - PROGRESS NOTE DETAILS
ED Attending Dalton Antunez MD,   63y/o female signed out to me with C/C chest pain and a normal cardiac catherization one week ago to check CTA of Chest.  CTA normal no pulmonary embolism, Pt does have a small hiatal hernia.  Pt has mild electrolyte abnormality and K will be replaced and patient to be discharged.

## 2017-10-13 NOTE — ED ADULT NURSE NOTE - OBJECTIVE STATEMENT
RN Facilitator: Pt received from [facility] into room 26 co generalized CP since 11pm last night. Pt states pain is a stabbing pain worse on inspiration, pt also co dry cough x 1 month. As per EMS 3 sublingual nitro were given, 325 mg aspirin. Pt. states chest pain is not relieved. PT A&Ox4, in NAD. Pt denies SOB, fevers, chills, recent illness, ABD pain, N/V/D. Md evaluated, VS as noted, placed on cardiac monitor: NSR on monitor. PT has PMh COPD, HTN, cerebral palsy: wheelchair bound, Depression, Anxiety. Unable to obtain peripheral Iv access: MD Anderson notified and will obtain ultrasound IV. Pt awaiting CT scan. Report given to JOSEPHINE Dee. Will continue to monitor.

## 2017-10-13 NOTE — ED PROVIDER NOTE - OBJECTIVE STATEMENT
62yof w/ HTN, COPD, GERD, depression p/w chest pain since 11pm last night. Sudden onset "pounding" chest pain radiating across the chest, unrelieved by 324mg asa and NTG x 3. No precipitating factor. Worsens w/ deep inspiration and coughing. Endorses mild dyspnea associated with it. Chronic, non-productive cough for several weeks. No fevers or chills. Recent admission last week for cough, attributed to COPD vs chronic reflux and was previously treated with multiple rounds of antibiotics. Has had work up including echo, cath, serial enzymes all of which were negative. Has chronic LE edema, recent LE duplex was negative as well.

## 2017-10-13 NOTE — ED PROVIDER NOTE - CARE PLAN
Principal Discharge DX:	Chest pain  Instructions for follow-up, activity and diet:	- Please follow up with your PMD early next week  - Return for any new or worsening symptoms

## 2017-10-13 NOTE — ED ADULT TRIAGE NOTE - CHIEF COMPLAINT QUOTE
pt. from [facility] c/o left sided chest pain radiating to right side. As per EMS 3 sublingual nitro were given , 325 mg aspirin. Pt. states chest pain is not relieved. Pt. appears in NAD, EKG in progress.

## 2017-10-13 NOTE — ED PROVIDER NOTE - ATTENDING CONTRIBUTION TO CARE
Attending Note (Shanna): patient complaining of chest pain.  states is different than he copd. does not feel like it is her sob.  had recent cath, no concern for acs.  given recent hospitalization, moderate risk pe. will perform cta and reassess.

## 2017-10-13 NOTE — PROVIDER CONTACT NOTE (OTHER) - BACKGROUND
ED resident asked ED SW to arrange ambulance for discharged pt back to [facility].  SW spoke to Leia in Admissions [phone] who agreed with plan.  ED resident will be

## 2017-10-13 NOTE — ED ADULT NURSE REASSESSMENT NOTE - NS ED NURSE REASSESS COMMENT FT1
Pt discharged back to facility, left main ED room 26 at this time via stretcher transported by [facility] ambulance [facility]. Pt awake, alert at this time. IV removed by JOSEPHINE Morris. Discharged instructions reviewed by MD MARY Hillman. Safety maintained in main ED.

## 2017-10-13 NOTE — PROVIDER CONTACT NOTE (OTHER) - ASSESSMENT
sending the discharge summary to nursing home.  [facility] ambulance to transport pt back at approximately 12 noon today.  Pt is in agreement with discharge plan.  SW left a voice mail for pt's HCP, Gina [phone] of pt's return to nursing home

## 2017-10-13 NOTE — ED ADULT NURSE REASSESSMENT NOTE - NS ED NURSE REASSESS COMMENT FT1
Report received from JOSEPHINE Garvin at 0815. Pt found to be lethargic, responsive to loud verbal and painful stimuli, pt able to verbally respond appropriately to name, date of birth, month, etc., pt immediately falling back asleep. Pt attempting to follow commands, pt very weak. MD Hillman and MD Antunez made aware. VS documented per flow. Will continue to monitor.

## 2017-10-31 ENCOUNTER — EMERGENCY (EMERGENCY)
Facility: HOSPITAL | Age: 62
LOS: 1 days | Discharge: HOME | End: 2017-10-31
Attending: EMERGENCY MEDICINE | Admitting: EMERGENCY MEDICINE
Payer: MEDICAID

## 2017-10-31 VITALS
OXYGEN SATURATION: 100 % | DIASTOLIC BLOOD PRESSURE: 90 MMHG | SYSTOLIC BLOOD PRESSURE: 164 MMHG | HEART RATE: 84 BPM | RESPIRATION RATE: 16 BRPM

## 2017-10-31 VITALS
RESPIRATION RATE: 18 BRPM | DIASTOLIC BLOOD PRESSURE: 97 MMHG | OXYGEN SATURATION: 99 % | HEART RATE: 88 BPM | TEMPERATURE: 98 F | SYSTOLIC BLOOD PRESSURE: 144 MMHG

## 2017-10-31 DIAGNOSIS — Z96.659 PRESENCE OF UNSPECIFIED ARTIFICIAL KNEE JOINT: Chronic | ICD-10-CM

## 2017-10-31 LAB
ALBUMIN SERPL ELPH-MCNC: 4.3 G/DL — SIGNIFICANT CHANGE UP (ref 3.3–5)
ALP SERPL-CCNC: 118 U/L — SIGNIFICANT CHANGE UP (ref 40–120)
ALT FLD-CCNC: 14 U/L — SIGNIFICANT CHANGE UP (ref 4–33)
APPEARANCE UR: CLEAR — SIGNIFICANT CHANGE UP
APTT BLD: 30.7 SEC — SIGNIFICANT CHANGE UP (ref 27.5–37.4)
AST SERPL-CCNC: 13 U/L — SIGNIFICANT CHANGE UP (ref 4–32)
BASE EXCESS BLDV CALC-SCNC: 1.6 MMOL/L — SIGNIFICANT CHANGE UP
BASOPHILS # BLD AUTO: 0.04 K/UL — SIGNIFICANT CHANGE UP (ref 0–0.2)
BASOPHILS NFR BLD AUTO: 0.5 % — SIGNIFICANT CHANGE UP (ref 0–2)
BILIRUB SERPL-MCNC: 0.3 MG/DL — SIGNIFICANT CHANGE UP (ref 0.2–1.2)
BILIRUB UR-MCNC: NEGATIVE — SIGNIFICANT CHANGE UP
BLOOD GAS VENOUS - CREATININE: 1.16 MG/DL — SIGNIFICANT CHANGE UP (ref 0.5–1.3)
BLOOD UR QL VISUAL: NEGATIVE — SIGNIFICANT CHANGE UP
BUN SERPL-MCNC: 15 MG/DL — SIGNIFICANT CHANGE UP (ref 7–23)
CALCIUM SERPL-MCNC: 9.2 MG/DL — SIGNIFICANT CHANGE UP (ref 8.4–10.5)
CHLORIDE BLDV-SCNC: 108 MMOL/L — SIGNIFICANT CHANGE UP (ref 96–108)
CHLORIDE SERPL-SCNC: 105 MMOL/L — SIGNIFICANT CHANGE UP (ref 98–107)
CO2 SERPL-SCNC: 25 MMOL/L — SIGNIFICANT CHANGE UP (ref 22–31)
COLOR SPEC: SIGNIFICANT CHANGE UP
CREAT SERPL-MCNC: 1.18 MG/DL — SIGNIFICANT CHANGE UP (ref 0.5–1.3)
EOSINOPHIL # BLD AUTO: 0.28 K/UL — SIGNIFICANT CHANGE UP (ref 0–0.5)
EOSINOPHIL NFR BLD AUTO: 3.7 % — SIGNIFICANT CHANGE UP (ref 0–6)
GAS PNL BLDV: 142 MMOL/L — SIGNIFICANT CHANGE UP (ref 136–146)
GLUCOSE BLDV-MCNC: 107 — HIGH (ref 70–99)
GLUCOSE SERPL-MCNC: 102 MG/DL — HIGH (ref 70–99)
GLUCOSE UR-MCNC: NEGATIVE — SIGNIFICANT CHANGE UP
HCO3 BLDV-SCNC: 24 MMOL/L — SIGNIFICANT CHANGE UP (ref 20–27)
HCT VFR BLD CALC: 39.4 % — SIGNIFICANT CHANGE UP (ref 34.5–45)
HCT VFR BLDV CALC: 40.5 % — SIGNIFICANT CHANGE UP (ref 34.5–45)
HGB BLD-MCNC: 12.5 G/DL — SIGNIFICANT CHANGE UP (ref 11.5–15.5)
HGB BLDV-MCNC: 13.2 G/DL — SIGNIFICANT CHANGE UP (ref 11.5–15.5)
IMM GRANULOCYTES # BLD AUTO: 0.02 # — SIGNIFICANT CHANGE UP
IMM GRANULOCYTES NFR BLD AUTO: 0.3 % — SIGNIFICANT CHANGE UP (ref 0–1.5)
INR BLD: 0.97 — SIGNIFICANT CHANGE UP (ref 0.88–1.17)
KETONES UR-MCNC: NEGATIVE — SIGNIFICANT CHANGE UP
LACTATE BLDV-MCNC: 1.3 MMOL/L — SIGNIFICANT CHANGE UP (ref 0.5–2)
LEUKOCYTE ESTERASE UR-ACNC: NEGATIVE — SIGNIFICANT CHANGE UP
LIDOCAIN IGE QN: 22.6 U/L — SIGNIFICANT CHANGE UP (ref 7–60)
LYMPHOCYTES # BLD AUTO: 1.53 K/UL — SIGNIFICANT CHANGE UP (ref 1–3.3)
LYMPHOCYTES # BLD AUTO: 20.2 % — SIGNIFICANT CHANGE UP (ref 13–44)
MCHC RBC-ENTMCNC: 28.8 PG — SIGNIFICANT CHANGE UP (ref 27–34)
MCHC RBC-ENTMCNC: 31.7 % — LOW (ref 32–36)
MCV RBC AUTO: 90.8 FL — SIGNIFICANT CHANGE UP (ref 80–100)
MONOCYTES # BLD AUTO: 0.56 K/UL — SIGNIFICANT CHANGE UP (ref 0–0.9)
MONOCYTES NFR BLD AUTO: 7.4 % — SIGNIFICANT CHANGE UP (ref 2–14)
MUCOUS THREADS # UR AUTO: SIGNIFICANT CHANGE UP
NEUTROPHILS # BLD AUTO: 5.15 K/UL — SIGNIFICANT CHANGE UP (ref 1.8–7.4)
NEUTROPHILS NFR BLD AUTO: 67.9 % — SIGNIFICANT CHANGE UP (ref 43–77)
NITRITE UR-MCNC: NEGATIVE — SIGNIFICANT CHANGE UP
NON-SQ EPI CELLS # UR AUTO: <1 — SIGNIFICANT CHANGE UP
NRBC # FLD: 0 — SIGNIFICANT CHANGE UP
PCO2 BLDV: 52 MMHG — HIGH (ref 41–51)
PH BLDV: 7.33 PH — SIGNIFICANT CHANGE UP (ref 7.32–7.43)
PH UR: 7 — SIGNIFICANT CHANGE UP (ref 4.6–8)
PLATELET # BLD AUTO: 236 K/UL — SIGNIFICANT CHANGE UP (ref 150–400)
PMV BLD: 10.4 FL — SIGNIFICANT CHANGE UP (ref 7–13)
PO2 BLDV: 27 MMHG — LOW (ref 35–40)
POTASSIUM BLDV-SCNC: 3.4 MMOL/L — SIGNIFICANT CHANGE UP (ref 3.4–4.5)
POTASSIUM SERPL-MCNC: 3.4 MMOL/L — LOW (ref 3.5–5.3)
POTASSIUM SERPL-SCNC: 3.4 MMOL/L — LOW (ref 3.5–5.3)
PROT SERPL-MCNC: 6.8 G/DL — SIGNIFICANT CHANGE UP (ref 6–8.3)
PROT UR-MCNC: NEGATIVE — SIGNIFICANT CHANGE UP
PROTHROM AB SERPL-ACNC: 10.9 SEC — SIGNIFICANT CHANGE UP (ref 9.8–13.1)
RBC # BLD: 4.34 M/UL — SIGNIFICANT CHANGE UP (ref 3.8–5.2)
RBC # FLD: 13.2 % — SIGNIFICANT CHANGE UP (ref 10.3–14.5)
RBC CASTS # UR COMP ASSIST: SIGNIFICANT CHANGE UP (ref 0–?)
SAO2 % BLDV: 42.5 % — LOW (ref 60–85)
SODIUM SERPL-SCNC: 144 MMOL/L — SIGNIFICANT CHANGE UP (ref 135–145)
SP GR SPEC: 1.01 — SIGNIFICANT CHANGE UP (ref 1–1.03)
SQUAMOUS # UR AUTO: SIGNIFICANT CHANGE UP
UROBILINOGEN FLD QL: NORMAL E.U. — SIGNIFICANT CHANGE UP (ref 0.1–0.2)
WBC # BLD: 7.58 K/UL — SIGNIFICANT CHANGE UP (ref 3.8–10.5)
WBC # FLD AUTO: 7.58 K/UL — SIGNIFICANT CHANGE UP (ref 3.8–10.5)

## 2017-10-31 PROCEDURE — 74176 CT ABD & PELVIS W/O CONTRAST: CPT | Mod: 26

## 2017-10-31 PROCEDURE — 71010: CPT | Mod: 26

## 2017-10-31 PROCEDURE — 76705 ECHO EXAM OF ABDOMEN: CPT | Mod: 26

## 2017-10-31 PROCEDURE — 99285 EMERGENCY DEPT VISIT HI MDM: CPT | Mod: 25

## 2017-10-31 RX ORDER — ONDANSETRON 8 MG/1
4 TABLET, FILM COATED ORAL ONCE
Qty: 0 | Refills: 0 | Status: COMPLETED | OUTPATIENT
Start: 2017-10-31 | End: 2017-10-31

## 2017-10-31 RX ORDER — ACETAMINOPHEN 500 MG
1000 TABLET ORAL ONCE
Qty: 0 | Refills: 0 | Status: COMPLETED | OUTPATIENT
Start: 2017-10-31 | End: 2017-10-31

## 2017-10-31 RX ORDER — ONDANSETRON 8 MG/1
4 TABLET, FILM COATED ORAL ONCE
Qty: 0 | Refills: 0 | Status: DISCONTINUED | OUTPATIENT
Start: 2017-10-31 | End: 2017-10-31

## 2017-10-31 RX ORDER — FAMOTIDINE 10 MG/ML
20 INJECTION INTRAVENOUS ONCE
Qty: 0 | Refills: 0 | Status: COMPLETED | OUTPATIENT
Start: 2017-10-31 | End: 2017-10-31

## 2017-10-31 RX ORDER — SODIUM CHLORIDE 9 MG/ML
1000 INJECTION INTRAMUSCULAR; INTRAVENOUS; SUBCUTANEOUS ONCE
Qty: 0 | Refills: 0 | Status: COMPLETED | OUTPATIENT
Start: 2017-10-31 | End: 2017-10-31

## 2017-10-31 RX ADMIN — Medication 30 MILLILITER(S): at 12:02

## 2017-10-31 RX ADMIN — SODIUM CHLORIDE 1000 MILLILITER(S): 9 INJECTION INTRAMUSCULAR; INTRAVENOUS; SUBCUTANEOUS at 14:52

## 2017-10-31 RX ADMIN — Medication 400 MILLIGRAM(S): at 12:01

## 2017-10-31 RX ADMIN — Medication 1000 MILLIGRAM(S): at 14:52

## 2017-10-31 RX ADMIN — ONDANSETRON 4 MILLIGRAM(S): 8 TABLET, FILM COATED ORAL at 12:01

## 2017-10-31 RX ADMIN — FAMOTIDINE 20 MILLIGRAM(S): 10 INJECTION INTRAVENOUS at 12:01

## 2017-10-31 NOTE — ED PROVIDER NOTE - CARE PLAN
Principal Discharge DX:	Nausea and vomiting, intractability of vomiting not specified, unspecified vomiting type  Secondary Diagnosis:	Abdominal pain, unspecified abdominal location

## 2017-10-31 NOTE — ED SUB INTERN NOTE - MEDICAL DECISION MAKING DETAILS
Differential: Constipation, Ulcerative colitis exacerbation, Diverticulitis, Less likely SBO, ischemic bowel,  CBC, BMP, Lipase, CT abdomen pelvis, UA. Zofran for vomiting. Reevaluate after testing.

## 2017-10-31 NOTE — ED ADULT NURSE NOTE - OBJECTIVE STATEMENT
Rec'd in room 25. CC  [facility] for c/o vomiting and lower abdominal pain since Saturday. Upon assessment, pt is aox3. However, behavior is infantile. Had a josr bear at the bedside. Vomiting. MD aware. Pt is hard stick. Attempted several times. Unable to get labs or IV access. MD aware.

## 2017-10-31 NOTE — ED PROCEDURE NOTE - ATTENDING CONTRIBUTION TO CARE
I personally supervised this procedure performed by the resident and I agree with the above documentation.

## 2017-10-31 NOTE — ED PROVIDER NOTE - PROGRESS NOTE DETAILS
Called by ct, patient refusing iv contrast as had vomiting from it last time, explained we can give her medication as it helps understand the images better however refusing. ao3, understands ct may  not be as helpful, will do noncon. Julianna Jordan, DO: Pt feels better. Passed PO challenge. D/w NF & patient can be given Zofran at NF. Given strict return precautions. Julianna Jordan, DO: Pt feels better. Passed PO challenge. D/w NF & patient can be given Zofran at NF. Given strict return precautions. all results d/w patient including incidental findings, will f/u with pcp.

## 2017-10-31 NOTE — PROVIDER CONTACT NOTE (OTHER) - ASSESSMENT
Pt stable to return to [facility] via ambulance.  [facility] contacted for ambulance auth.  P/U at  ,  ref# Pt stable to return to [facility] via ambulance.  MAS contacted for ambulance auth.  P/U at 5:30pm ,  ref#[id number]

## 2017-10-31 NOTE — ED SUB INTERN NOTE - OBJECTIVE STATEMENT FT
61 y/o f complaining of N/V X2 days and constipation x3 weeks. PMH of UC, COPD, HTN, and prior alcohol abuse. Pt has taken lactulose for constipation with no relief. pt claims to have UC attack 1 week ago but denies pain currently.

## 2017-10-31 NOTE — ED PROVIDER NOTE - OBJECTIVE STATEMENT
Julianna Jordan, DO: 62F with hx of anxiety, depression, HTN, COPD, UC, urinary incontinence & no surgical hx here for constipation x 1 week & nausea with NBNB vomiting x 2 days. No flatus. Reported UC flare 1 week ago. C/o abdominal pain while lying flat & intermittent CP. No fevers/chills, dysuria.

## 2017-10-31 NOTE — ED PROVIDER NOTE - ATTENDING CONTRIBUTION TO CARE
BERLIN Hatfield: I have personally performed a face to face bedside history and physical examination of this patient. I have discussed the history, examination, review of systems, assessment and plan of management with the resident. I have reviewed the electronic medical record and amended it to reflect my history, review of systems, physical exam, assessment and plan.     62f presents with abd pain/vomiting. Started 2 days ago with vomiting, nb/nb, multiple episodes, unable to tolerate anything po, then developed constant generalized abd pain, moderate in severity. No associated fevers, chills, ha, cp, sob, dysuria. Patient has been constipated for last 2 days, has tried laxatives with no success. No h/o abd surgeries.   exam  GEN - NAD; well appearing; A+O x3   HEAD - NC/AT   EYES- PERRL, EOMI  ENT: Airway patent, DRY mm, Oral cavity and pharynx normal. No inflammation, swelling, exudate, or lesions.  NECK: Neck supple, non-tender without lymphadenopathy, no masses.  PULMONARY - CTA b/l, symmetric breath sounds.   CARDIAC -s1s2, RRR, no M,G,R  ABDOMEN - +BS, soft, ttp in ruq/luq/rlq, no guarding, no rebound, no masses   BACK - no CVA tenderness, Normal  spine   EXTREMITIES - symmetric pulses, capillary refill < 2 seconds, no clubbing, no cyanosis, no edema   SKIN - no rash or bruising   NEUROLOGIC - alert, CN 2-12 intact, nl strength/sensation, no focal deficits  PSYCH -nl mood/affect, nl insight.  a/p-62f, abd pin BERLIN Hatfield: I have personally performed a face to face bedside history and physical examination of this patient. I have discussed the history, examination, review of systems, assessment and plan of management with the resident. I have reviewed the electronic medical record and amended it to reflect my history, review of systems, physical exam, assessment and plan.     62f presents with abd pain/vomiting. Started 2 days ago with vomiting, nb/nb, multiple episodes, unable to tolerate anything po, then developed constant generalized abd pain, moderate in severity. No associated fevers, chills, ha, cp, sob, dysuria. Patient has been constipated for last 2 days, has tried laxatives with no success. No h/o abd surgeries.   exam  GEN - NAD; well appearing; A+O x3   HEAD - NC/AT   EYES- PERRL, EOMI  ENT: Airway patent, DRY mm, Oral cavity and pharynx normal. No inflammation, swelling, exudate, or lesions.  NECK: Neck supple, non-tender without lymphadenopathy, no masses.  PULMONARY - CTA b/l, symmetric breath sounds.   CARDIAC -s1s2, RRR, no M,G,R  ABDOMEN - +BS, soft, ttp in ruq/luq/rlq, no guarding, no rebound, no masses   BACK - no CVA tenderness, Normal  spine   EXTREMITIES - symmetric pulses, capillary refill < 2 seconds, no clubbing, no cyanosis, no edema   SKIN - no rash or bruising   NEUROLOGIC - alert, CN 2-12 intact, nl strength/sensation, no focal deficits  PSYCH -nl mood/affect, nl insight.  a/p-62f, abd pain, ruq, luq, rlq ttp, vomiting, vss, will check labs, ua, us ruq and ct, symptom ctrl, reass.

## 2017-10-31 NOTE — ED PROCEDURE NOTE - PROCEDURE ADDITIONAL DETAILS
Peripheral IV access in the Emergency Department obtained under dynamic ultrasound guidance with dark nonpulsatile blood return.  Catheter was flushed afterwards without any resistance or resultant extravasation.  IV catheter confirmed in compressible vein after insertion.
no gallstones or pericholecystic fluid seen,  GB wall 3.7mm (nl)  CBD  3.1mm (nl)   negative sonographic Melendez's  Rt kidney visualized  no hydronephrosis seen   renal size WNL

## 2017-10-31 NOTE — ED PROVIDER NOTE - PHYSICAL EXAMINATION
Julianna Jordan, DO:   Gen: Well appearing, NAD, obese  Head: NCAT  HEENT: PERRL, MMM, normal conjunctiva, anicteric, neck supple  Lung: CTAB, no adventitious sounds  CV: RRR, no murmurs, rubs or gallops  Abd: soft, TTP in RUQ without Melendez's, b/l LQ without no rebound or guarding, no CVAT  MSK: No edema, no visible deformities  Neuro: No focal neurologic deficits. Moving all extremities equally. Ambulatory with assistance at baseline.   Skin: Warm and dry, no evidence of rash or skin breakdown   Psych: normal mood and affect Julianna Jordan, DO:   Gen: Well appearing, NAD, obese  Head: NCAT  HEENT: PERRL, MMM, normal conjunctiva, anicteric, neck supple  Lung: CTAB, no adventitious sounds  CV: RRR, no murmurs, rubs or gallops  Abd: soft, TTP in RUQ without Melendez's, b/l LQ without no rebound or guarding, no CVAT  MSK: 3+ b/l edema, no visible deformities  Neuro: No focal neurologic deficits. Moving all extremities equally. Ambulatory with assistance at baseline.   Skin: Warm and dry, no evidence of rash or skin breakdown   Psych: normal mood and affect

## 2017-10-31 NOTE — ED PROVIDER NOTE - NS ED ROS FT
Julianna Jordan, DO: ROS: denies HA, weakness, dizziness, diaphoresis, joint pain, neuro deficits, dysuria/hematuria, rash, otherwise as HPI.

## 2017-10-31 NOTE — ED PROVIDER NOTE - MEDICAL DECISION MAKING DETAILS
Julianna Jordan, DO: 62F coming from  with virgin abdomen b/l abd pain non-peritoneal. Will eval for SBO vs. biliary pathology given RUQ TTP vs. constipation vs. UC flare vs. UTI in setting of incontinence. No neuro deficits. Pulses equal b/l.

## 2017-10-31 NOTE — ED ADULT TRIAGE NOTE - CHIEF COMPLAINT QUOTE
Pt brought to ER by EMS from [facility]. Patient has c/o vomiting that started Saturday night and last time was midnight.

## 2017-11-02 LAB
BACTERIA UR CULT: SIGNIFICANT CHANGE UP
SPECIMEN SOURCE: SIGNIFICANT CHANGE UP

## 2017-11-21 ENCOUNTER — EMERGENCY (EMERGENCY)
Facility: HOSPITAL | Age: 62
LOS: 1 days | Discharge: HOME | End: 2017-11-21
Attending: EMERGENCY MEDICINE | Admitting: EMERGENCY MEDICINE
Payer: MEDICAID

## 2017-11-21 VITALS
RESPIRATION RATE: 16 BRPM | HEART RATE: 87 BPM | TEMPERATURE: 98 F | DIASTOLIC BLOOD PRESSURE: 95 MMHG | OXYGEN SATURATION: 100 % | SYSTOLIC BLOOD PRESSURE: 156 MMHG

## 2017-11-21 VITALS
TEMPERATURE: 98 F | DIASTOLIC BLOOD PRESSURE: 70 MMHG | OXYGEN SATURATION: 100 % | HEART RATE: 76 BPM | SYSTOLIC BLOOD PRESSURE: 148 MMHG | RESPIRATION RATE: 18 BRPM

## 2017-11-21 DIAGNOSIS — Z96.659 PRESENCE OF UNSPECIFIED ARTIFICIAL KNEE JOINT: Chronic | ICD-10-CM

## 2017-11-21 LAB
ALBUMIN SERPL ELPH-MCNC: 4.5 G/DL — SIGNIFICANT CHANGE UP (ref 3.3–5)
ALP SERPL-CCNC: 130 U/L — HIGH (ref 40–120)
ALT FLD-CCNC: 20 U/L — SIGNIFICANT CHANGE UP (ref 4–33)
AMYLASE P1 CFR SERPL: 39 U/L — SIGNIFICANT CHANGE UP (ref 25–125)
APPEARANCE UR: CLEAR — SIGNIFICANT CHANGE UP
APTT BLD: 30.5 SEC — SIGNIFICANT CHANGE UP (ref 27.5–37.4)
AST SERPL-CCNC: 18 U/L — SIGNIFICANT CHANGE UP (ref 4–32)
BASE EXCESS BLDV CALC-SCNC: 0.4 MMOL/L — SIGNIFICANT CHANGE UP
BASOPHILS # BLD AUTO: 0.06 K/UL — SIGNIFICANT CHANGE UP (ref 0–0.2)
BASOPHILS NFR BLD AUTO: 0.8 % — SIGNIFICANT CHANGE UP (ref 0–2)
BILIRUB SERPL-MCNC: 0.2 MG/DL — SIGNIFICANT CHANGE UP (ref 0.2–1.2)
BILIRUB UR-MCNC: NEGATIVE — SIGNIFICANT CHANGE UP
BLD GP AB SCN SERPL QL: NEGATIVE — SIGNIFICANT CHANGE UP
BLOOD GAS VENOUS - CREATININE: 1.17 MG/DL — SIGNIFICANT CHANGE UP (ref 0.5–1.3)
BLOOD UR QL VISUAL: NEGATIVE — SIGNIFICANT CHANGE UP
BUN SERPL-MCNC: 14 MG/DL — SIGNIFICANT CHANGE UP (ref 7–23)
CALCIUM SERPL-MCNC: 9.6 MG/DL — SIGNIFICANT CHANGE UP (ref 8.4–10.5)
CHLORIDE BLDV-SCNC: 110 MMOL/L — HIGH (ref 96–108)
CHLORIDE SERPL-SCNC: 103 MMOL/L — SIGNIFICANT CHANGE UP (ref 98–107)
CK MB BLD-MCNC: 1.71 NG/ML — SIGNIFICANT CHANGE UP (ref 1–4.7)
CK MB BLD-MCNC: 2.33 NG/ML — SIGNIFICANT CHANGE UP (ref 1–4.7)
CK MB BLD-MCNC: SIGNIFICANT CHANGE UP (ref 0–2.5)
CK MB BLD-MCNC: SIGNIFICANT CHANGE UP (ref 0–2.5)
CK SERPL-CCNC: 68 U/L — SIGNIFICANT CHANGE UP (ref 25–170)
CK SERPL-CCNC: 83 U/L — SIGNIFICANT CHANGE UP (ref 25–170)
CO2 SERPL-SCNC: 25 MMOL/L — SIGNIFICANT CHANGE UP (ref 22–31)
COLOR SPEC: SIGNIFICANT CHANGE UP
CREAT SERPL-MCNC: 1.25 MG/DL — SIGNIFICANT CHANGE UP (ref 0.5–1.3)
EOSINOPHIL # BLD AUTO: 0.38 K/UL — SIGNIFICANT CHANGE UP (ref 0–0.5)
EOSINOPHIL NFR BLD AUTO: 5.3 % — SIGNIFICANT CHANGE UP (ref 0–6)
GAS PNL BLDV: 137 MMOL/L — SIGNIFICANT CHANGE UP (ref 136–146)
GLUCOSE BLDV-MCNC: 99 — SIGNIFICANT CHANGE UP (ref 70–99)
GLUCOSE SERPL-MCNC: 102 MG/DL — HIGH (ref 70–99)
GLUCOSE UR-MCNC: NEGATIVE — SIGNIFICANT CHANGE UP
HCO3 BLDV-SCNC: 23 MMOL/L — SIGNIFICANT CHANGE UP (ref 20–27)
HCT VFR BLD CALC: 42.1 % — SIGNIFICANT CHANGE UP (ref 34.5–45)
HCT VFR BLDV CALC: 38.1 % — SIGNIFICANT CHANGE UP (ref 34.5–45)
HGB BLD-MCNC: 13 G/DL — SIGNIFICANT CHANGE UP (ref 11.5–15.5)
HGB BLDV-MCNC: 12.4 G/DL — SIGNIFICANT CHANGE UP (ref 11.5–15.5)
IMM GRANULOCYTES # BLD AUTO: 0 # — SIGNIFICANT CHANGE UP
IMM GRANULOCYTES NFR BLD AUTO: 0 % — SIGNIFICANT CHANGE UP (ref 0–1.5)
INR BLD: 0.91 — SIGNIFICANT CHANGE UP (ref 0.88–1.17)
KETONES UR-MCNC: NEGATIVE — SIGNIFICANT CHANGE UP
LACTATE BLDV-MCNC: 1.4 MMOL/L — SIGNIFICANT CHANGE UP (ref 0.5–2)
LEUKOCYTE ESTERASE UR-ACNC: HIGH
LIDOCAIN IGE QN: 55.7 U/L — SIGNIFICANT CHANGE UP (ref 7–60)
LYMPHOCYTES # BLD AUTO: 2.42 K/UL — SIGNIFICANT CHANGE UP (ref 1–3.3)
LYMPHOCYTES # BLD AUTO: 33.8 % — SIGNIFICANT CHANGE UP (ref 13–44)
MCHC RBC-ENTMCNC: 28.7 PG — SIGNIFICANT CHANGE UP (ref 27–34)
MCHC RBC-ENTMCNC: 30.9 % — LOW (ref 32–36)
MCV RBC AUTO: 92.9 FL — SIGNIFICANT CHANGE UP (ref 80–100)
MONOCYTES # BLD AUTO: 0.52 K/UL — SIGNIFICANT CHANGE UP (ref 0–0.9)
MONOCYTES NFR BLD AUTO: 7.3 % — SIGNIFICANT CHANGE UP (ref 2–14)
MUCOUS THREADS # UR AUTO: SIGNIFICANT CHANGE UP
NEUTROPHILS # BLD AUTO: 3.77 K/UL — SIGNIFICANT CHANGE UP (ref 1.8–7.4)
NEUTROPHILS NFR BLD AUTO: 52.8 % — SIGNIFICANT CHANGE UP (ref 43–77)
NITRITE UR-MCNC: NEGATIVE — SIGNIFICANT CHANGE UP
NRBC # FLD: 0 — SIGNIFICANT CHANGE UP
PCO2 BLDV: 52 MMHG — HIGH (ref 41–51)
PH BLDV: 7.32 PH — SIGNIFICANT CHANGE UP (ref 7.32–7.43)
PH UR: 6.5 — SIGNIFICANT CHANGE UP (ref 4.6–8)
PLATELET # BLD AUTO: 220 K/UL — SIGNIFICANT CHANGE UP (ref 150–400)
PMV BLD: 10.6 FL — SIGNIFICANT CHANGE UP (ref 7–13)
PO2 BLDV: 27 MMHG — LOW (ref 35–40)
POTASSIUM BLDV-SCNC: 3.1 MMOL/L — LOW (ref 3.4–4.5)
POTASSIUM SERPL-MCNC: 3.3 MMOL/L — LOW (ref 3.5–5.3)
POTASSIUM SERPL-SCNC: 3.3 MMOL/L — LOW (ref 3.5–5.3)
PROT SERPL-MCNC: 7 G/DL — SIGNIFICANT CHANGE UP (ref 6–8.3)
PROT UR-MCNC: NEGATIVE — SIGNIFICANT CHANGE UP
PROTHROM AB SERPL-ACNC: 10.2 SEC — SIGNIFICANT CHANGE UP (ref 9.8–13.1)
RBC # BLD: 4.53 M/UL — SIGNIFICANT CHANGE UP (ref 3.8–5.2)
RBC # FLD: 13.3 % — SIGNIFICANT CHANGE UP (ref 10.3–14.5)
RBC CASTS # UR COMP ASSIST: SIGNIFICANT CHANGE UP (ref 0–?)
RH IG SCN BLD-IMP: POSITIVE — SIGNIFICANT CHANGE UP
SAO2 % BLDV: 41.9 % — LOW (ref 60–85)
SODIUM SERPL-SCNC: 142 MMOL/L — SIGNIFICANT CHANGE UP (ref 135–145)
SP GR SPEC: 1.02 — SIGNIFICANT CHANGE UP (ref 1–1.03)
SQUAMOUS # UR AUTO: SIGNIFICANT CHANGE UP
TROPONIN T SERPL-MCNC: < 0.06 NG/ML — SIGNIFICANT CHANGE UP (ref 0–0.06)
TROPONIN T SERPL-MCNC: < 0.06 NG/ML — SIGNIFICANT CHANGE UP (ref 0–0.06)
UROBILINOGEN FLD QL: NORMAL E.U. — SIGNIFICANT CHANGE UP (ref 0.1–0.2)
WBC # BLD: 7.15 K/UL — SIGNIFICANT CHANGE UP (ref 3.8–10.5)
WBC # FLD AUTO: 7.15 K/UL — SIGNIFICANT CHANGE UP (ref 3.8–10.5)
WBC UR QL: SIGNIFICANT CHANGE UP (ref 0–?)

## 2017-11-21 PROCEDURE — 36569 INSJ PICC 5 YR+ W/O IMAGING: CPT | Mod: LT

## 2017-11-21 PROCEDURE — 99285 EMERGENCY DEPT VISIT HI MDM: CPT | Mod: 25

## 2017-11-21 PROCEDURE — 76705 ECHO EXAM OF ABDOMEN: CPT | Mod: 26

## 2017-11-21 PROCEDURE — 74174 CTA ABD&PLVS W/CONTRAST: CPT | Mod: 26

## 2017-11-21 RX ORDER — ACETAMINOPHEN 500 MG
1000 TABLET ORAL ONCE
Qty: 0 | Refills: 0 | Status: COMPLETED | OUTPATIENT
Start: 2017-11-21 | End: 2017-11-21

## 2017-11-21 RX ORDER — ONDANSETRON 8 MG/1
4 TABLET, FILM COATED ORAL ONCE
Qty: 0 | Refills: 0 | Status: COMPLETED | OUTPATIENT
Start: 2017-11-21 | End: 2017-11-21

## 2017-11-21 RX ORDER — SODIUM CHLORIDE 9 MG/ML
1000 INJECTION INTRAMUSCULAR; INTRAVENOUS; SUBCUTANEOUS ONCE
Qty: 0 | Refills: 0 | Status: COMPLETED | OUTPATIENT
Start: 2017-11-21 | End: 2017-11-21

## 2017-11-21 RX ADMIN — ONDANSETRON 4 MILLIGRAM(S): 8 TABLET, FILM COATED ORAL at 06:19

## 2017-11-21 RX ADMIN — Medication 1000 MILLIGRAM(S): at 07:19

## 2017-11-21 RX ADMIN — SODIUM CHLORIDE 1000 MILLILITER(S): 9 INJECTION INTRAMUSCULAR; INTRAVENOUS; SUBCUTANEOUS at 06:19

## 2017-11-21 RX ADMIN — Medication 400 MILLIGRAM(S): at 06:19

## 2017-11-21 NOTE — ED PROVIDER NOTE - CARE PLAN
Principal Discharge DX:	Abdominal pain  Instructions for follow-up, activity and diet:	Follow up with your primary care provider within 48 hours  Rest and drink plenty of fluids  Return to the emergency department with any worsening or concerning symptoms, increased abdominal pain, persistent vomiting, new onset fever or any other concerning symptoms.

## 2017-11-21 NOTE — ED ADULT NURSE NOTE - OBJECTIVE STATEMENT
Pt. received into room # 8 with c/o abdominal pain, n/v/d.  vitals stable.  Pt. refusing PIV; asking for ultrasound IV. MD made aware. no acute distress noted. will continue to monitor.

## 2017-11-21 NOTE — PROVIDER CONTACT NOTE (OTHER) - ASSESSMENT
Pt stable to be transported back to [facility].  [facility] called for transportation auth.  auth#  given for senior care, P/U @  .

## 2017-11-21 NOTE — ED PROVIDER NOTE - PROGRESS NOTE DETAILS
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 Pts VSS, currently resting in bed with NAD Received signout on this pt from Dr. Multani: 61yo F w/ pmh inclusive of iliac dissection presenting with abd pain, awaiting CTA Abd/pelvis to r/o mesenteric ischemia, then reassess.   -Dr. Watson NIRALI Morocho: Pt with stable VSS, NAD. Informed patient of normal lab results and imaging studies, contacted social work to assist with transportation back to nursing home. Pt was able to tolerate PO.

## 2017-11-21 NOTE — ED PROVIDER NOTE - MEDICAL DECISION MAKING DETAILS
62F w HTN COPD illiac dissection presenting with cc of abdominal pain a/w n/v subjective f, diarrea, no cp sob no prior abdominal surgeries x5 days, on exam vss abdomen ttp RUQ, c/f yosef vs stone vs panc vs dissection will get labs urine ruq us, ct abdo pelvis, pain control, fluids, reassess

## 2017-11-21 NOTE — ED PROCEDURE NOTE - ATTENDING CONTRIBUTION TO CARE
I was present during the key portion of the procedure.  Successful long 20Ga L AC PIV insertion, confirmed on US after the fact, in place and functional.

## 2017-11-21 NOTE — ED PROVIDER NOTE - ATTENDING CONTRIBUTION TO CARE
62F p/w abdominal pain, N/V, dizziness, decr PO, diarrhea x 5 days, sent from [facility].  At bedside pt sleepy and not providing much history, but follows commands when stimulated.  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  VS:  unremarkable    GEN - sleeping but arousable, malaise, childlike emotional response   HEAD - NC/AT     ENT - PEERL, EOMI, mucous membranes dry , no discharge      NECK: Neck supple, non-tender without lymphadenopathy, no masses, no JVD  PULM - CTA b/l,  symmetric breath sounds  COR -  normal heart sounds    ABD - , ND, mild diffuse abd ttp, soft, no guarding, no rebound, no masses    BACK - no CVA tenderness, nontender spine     EXTREMS - no edema, no deformity, warm and well perfused    SKIN - no rash or bruising      NEUROLOGIC - alert, CN 2-12 intact, sensation nl, motor 5/5 RUE/LUE/RLE/LLE.      IMP:  62F p/w abd pain and multiple other complaints, with mild diffuse abd ttp and normal VS.  Rx pain meds, nausea meds, check labs, urine, as well as CT a/p angio to eval for dissection as pt had had an iliac artery dissection a few months ago as a complication of cardiac cath.  Would check 2x CE given vague sx, reassess.  If all acceptable, discharge back to SNF, follow up with your Medical Doctor within 1 week.  Return to Emergency Department for new or worsening symptoms.

## 2017-11-21 NOTE — ED PROVIDER NOTE - PLAN OF CARE
Follow up with your primary care provider within 48 hours  Rest and drink plenty of fluids  Return to the emergency department with any worsening or concerning symptoms, increased abdominal pain, persistent vomiting, new onset fever or any other concerning symptoms.

## 2017-11-21 NOTE — ED PROCEDURE NOTE - PROCEDURE ADDITIONAL DETAILS
Focused ED Ultrasound RUQ 71503  Grey scale RUQ views obtained in saggital and transverse planes.   No wall thickening, no gallbladder wall edema, and no pericholecystic fluid seen.  Negative sonographic aponte's.  No gallstones.    anterior gallbladder wall - 0.2cm  CBD - 0.2cm    Impression:  normal gallbladder.    Arianas

## 2017-11-21 NOTE — ED PROCEDURE NOTE - PROCEDURE ADDITIONAL DETAILS
pt tolerated procedure well w/o complication. flushing line captured on us. pt tolerated procedure well w/o complication. flushing line captured on us.  *educational study - not billed*

## 2017-11-21 NOTE — ED PROVIDER NOTE - OBJECTIVE STATEMENT
Pt is a 62F with a PMH of COPD HTN iliac dissection presenting with a cc of abdominal pain, nausea, vomiting, dizziness, x5d, pt reports experiencing total abdominal multiple episodes NB nausea, vomiting reduced PO, NB diarrhea over past x5 days, greater than 10 episodes each in total, never had pain like this before, cannot localize where pain began. Dysuria. No rash. Subjective fever. No chest pain. No SOB. No prior abdominal surgeries, endeorses still passing BM. Denies n/v/f/c/cp/sob. Denies headache, syncope, lightheadedness, dizziness. Denies chest palpitations. Denies dysuria, hematuria, hematochezia, BRBPR, tarry stools, diarrhea, constipation.

## 2017-11-21 NOTE — ED ADULT TRIAGE NOTE - CHIEF COMPLAINT QUOTE
Patient from [facility] c/o abdominal pain and chest pain. Patient c/o N/V/D starting last night. Hx. COPD, asthma. As per EMS, patient was 98% on RA in NH. Patient received on 2L NC by EMS. Patient given 162mg ASA by EMS. Patient appears in nad.

## 2017-11-22 LAB — SPECIMEN SOURCE: SIGNIFICANT CHANGE UP

## 2017-11-23 LAB
BACTERIA UR CULT: SIGNIFICANT CHANGE UP
SPECIMEN SOURCE: SIGNIFICANT CHANGE UP

## 2017-11-26 LAB — BACTERIA BLD CULT: SIGNIFICANT CHANGE UP

## 2017-12-19 ENCOUNTER — EMERGENCY (EMERGENCY)
Facility: HOSPITAL | Age: 62
LOS: 1 days | Discharge: HOME | End: 2017-12-19
Attending: EMERGENCY MEDICINE | Admitting: EMERGENCY MEDICINE
Payer: MEDICAID

## 2017-12-19 VITALS
HEART RATE: 75 BPM | TEMPERATURE: 98 F | DIASTOLIC BLOOD PRESSURE: 69 MMHG | RESPIRATION RATE: 16 BRPM | SYSTOLIC BLOOD PRESSURE: 140 MMHG | OXYGEN SATURATION: 98 %

## 2017-12-19 DIAGNOSIS — Z96.659 PRESENCE OF UNSPECIFIED ARTIFICIAL KNEE JOINT: Chronic | ICD-10-CM

## 2017-12-19 LAB
ALBUMIN SERPL ELPH-MCNC: 4.6 G/DL — SIGNIFICANT CHANGE UP (ref 3.3–5)
ALP SERPL-CCNC: 141 U/L — HIGH (ref 40–120)
ALT FLD-CCNC: 12 U/L — SIGNIFICANT CHANGE UP (ref 4–33)
APPEARANCE UR: CLEAR — SIGNIFICANT CHANGE UP
AST SERPL-CCNC: 15 U/L — SIGNIFICANT CHANGE UP (ref 4–32)
BASE EXCESS BLDV CALC-SCNC: 6.1 MMOL/L — SIGNIFICANT CHANGE UP
BASOPHILS # BLD AUTO: 0.05 K/UL — SIGNIFICANT CHANGE UP (ref 0–0.2)
BASOPHILS NFR BLD AUTO: 0.6 % — SIGNIFICANT CHANGE UP (ref 0–2)
BILIRUB SERPL-MCNC: 0.2 MG/DL — SIGNIFICANT CHANGE UP (ref 0.2–1.2)
BILIRUB UR-MCNC: NEGATIVE — SIGNIFICANT CHANGE UP
BLOOD GAS VENOUS - CREATININE: 1.25 MG/DL — SIGNIFICANT CHANGE UP (ref 0.5–1.3)
BLOOD UR QL VISUAL: NEGATIVE — SIGNIFICANT CHANGE UP
BUN SERPL-MCNC: 16 MG/DL — SIGNIFICANT CHANGE UP (ref 7–23)
CALCIUM SERPL-MCNC: 9.4 MG/DL — SIGNIFICANT CHANGE UP (ref 8.4–10.5)
CHLORIDE BLDV-SCNC: 110 MMOL/L — HIGH (ref 96–108)
CHLORIDE SERPL-SCNC: 104 MMOL/L — SIGNIFICANT CHANGE UP (ref 98–107)
CK MB BLD-MCNC: 1.57 NG/ML — SIGNIFICANT CHANGE UP (ref 1–4.7)
CK SERPL-CCNC: 69 U/L — SIGNIFICANT CHANGE UP (ref 25–170)
CO2 SERPL-SCNC: 26 MMOL/L — SIGNIFICANT CHANGE UP (ref 22–31)
COLOR SPEC: YELLOW — SIGNIFICANT CHANGE UP
CREAT SERPL-MCNC: 1.24 MG/DL — SIGNIFICANT CHANGE UP (ref 0.5–1.3)
EOSINOPHIL # BLD AUTO: 0.33 K/UL — SIGNIFICANT CHANGE UP (ref 0–0.5)
EOSINOPHIL NFR BLD AUTO: 4 % — SIGNIFICANT CHANGE UP (ref 0–6)
GAS PNL BLDV: 139 MMOL/L — SIGNIFICANT CHANGE UP (ref 136–146)
GLUCOSE BLDV-MCNC: 92 — SIGNIFICANT CHANGE UP (ref 70–99)
GLUCOSE SERPL-MCNC: 89 MG/DL — SIGNIFICANT CHANGE UP (ref 70–99)
GLUCOSE UR-MCNC: NEGATIVE — SIGNIFICANT CHANGE UP
HCO3 BLDV-SCNC: 27 MMOL/L — SIGNIFICANT CHANGE UP (ref 20–27)
HCT VFR BLD CALC: 39.3 % — SIGNIFICANT CHANGE UP (ref 34.5–45)
HCT VFR BLDV CALC: 39.3 % — SIGNIFICANT CHANGE UP (ref 34.5–45)
HGB BLD-MCNC: 12.1 G/DL — SIGNIFICANT CHANGE UP (ref 11.5–15.5)
HGB BLDV-MCNC: 12.8 G/DL — SIGNIFICANT CHANGE UP (ref 11.5–15.5)
IMM GRANULOCYTES # BLD AUTO: 0.02 # — SIGNIFICANT CHANGE UP
IMM GRANULOCYTES NFR BLD AUTO: 0.2 % — SIGNIFICANT CHANGE UP (ref 0–1.5)
KETONES UR-MCNC: NEGATIVE — SIGNIFICANT CHANGE UP
LACTATE BLDV-MCNC: 1.2 MMOL/L — SIGNIFICANT CHANGE UP (ref 0.5–2)
LEUKOCYTE ESTERASE UR-ACNC: NEGATIVE — SIGNIFICANT CHANGE UP
LYMPHOCYTES # BLD AUTO: 1.87 K/UL — SIGNIFICANT CHANGE UP (ref 1–3.3)
LYMPHOCYTES # BLD AUTO: 22.6 % — SIGNIFICANT CHANGE UP (ref 13–44)
MCHC RBC-ENTMCNC: 28.3 PG — SIGNIFICANT CHANGE UP (ref 27–34)
MCHC RBC-ENTMCNC: 30.8 % — LOW (ref 32–36)
MCV RBC AUTO: 92 FL — SIGNIFICANT CHANGE UP (ref 80–100)
MONOCYTES # BLD AUTO: 0.65 K/UL — SIGNIFICANT CHANGE UP (ref 0–0.9)
MONOCYTES NFR BLD AUTO: 7.9 % — SIGNIFICANT CHANGE UP (ref 2–14)
MUCOUS THREADS # UR AUTO: SIGNIFICANT CHANGE UP
NEUTROPHILS # BLD AUTO: 5.36 K/UL — SIGNIFICANT CHANGE UP (ref 1.8–7.4)
NEUTROPHILS NFR BLD AUTO: 64.7 % — SIGNIFICANT CHANGE UP (ref 43–77)
NITRITE UR-MCNC: NEGATIVE — SIGNIFICANT CHANGE UP
NRBC # FLD: 0 — SIGNIFICANT CHANGE UP
PCO2 BLDV: 60 MMHG — HIGH (ref 41–51)
PH BLDV: 7.34 PH — SIGNIFICANT CHANGE UP (ref 7.32–7.43)
PH UR: 7 — SIGNIFICANT CHANGE UP (ref 4.6–8)
PLATELET # BLD AUTO: 214 K/UL — SIGNIFICANT CHANGE UP (ref 150–400)
PMV BLD: 10.9 FL — SIGNIFICANT CHANGE UP (ref 7–13)
PO2 BLDV: 26 MMHG — LOW (ref 35–40)
POTASSIUM BLDV-SCNC: 3.1 MMOL/L — LOW (ref 3.4–4.5)
POTASSIUM SERPL-MCNC: 3.4 MMOL/L — LOW (ref 3.5–5.3)
POTASSIUM SERPL-SCNC: 3.4 MMOL/L — LOW (ref 3.5–5.3)
PROT SERPL-MCNC: 7 G/DL — SIGNIFICANT CHANGE UP (ref 6–8.3)
PROT UR-MCNC: NEGATIVE MG/DL — SIGNIFICANT CHANGE UP
RBC # BLD: 4.27 M/UL — SIGNIFICANT CHANGE UP (ref 3.8–5.2)
RBC # FLD: 13.2 % — SIGNIFICANT CHANGE UP (ref 10.3–14.5)
RBC CASTS # UR COMP ASSIST: SIGNIFICANT CHANGE UP (ref 0–?)
SAO2 % BLDV: 36.1 % — LOW (ref 60–85)
SODIUM SERPL-SCNC: 145 MMOL/L — SIGNIFICANT CHANGE UP (ref 135–145)
SP GR SPEC: 1.01 — SIGNIFICANT CHANGE UP (ref 1–1.04)
SQUAMOUS # UR AUTO: SIGNIFICANT CHANGE UP
TROPONIN T SERPL-MCNC: < 0.06 NG/ML — SIGNIFICANT CHANGE UP (ref 0–0.06)
UROBILINOGEN FLD QL: NORMAL MG/DL — SIGNIFICANT CHANGE UP
WBC # BLD: 8.28 K/UL — SIGNIFICANT CHANGE UP (ref 3.8–10.5)
WBC # FLD AUTO: 8.28 K/UL — SIGNIFICANT CHANGE UP (ref 3.8–10.5)
WBC UR QL: SIGNIFICANT CHANGE UP (ref 0–?)

## 2017-12-19 PROCEDURE — 71010: CPT | Mod: 26

## 2017-12-19 PROCEDURE — 99285 EMERGENCY DEPT VISIT HI MDM: CPT | Mod: 25

## 2017-12-19 PROCEDURE — 93010 ELECTROCARDIOGRAM REPORT: CPT | Mod: 59

## 2017-12-19 PROCEDURE — 74176 CT ABD & PELVIS W/O CONTRAST: CPT | Mod: 26

## 2017-12-19 RX ORDER — SODIUM CHLORIDE 9 MG/ML
1000 INJECTION INTRAMUSCULAR; INTRAVENOUS; SUBCUTANEOUS ONCE
Qty: 0 | Refills: 0 | Status: COMPLETED | OUTPATIENT
Start: 2017-12-19 | End: 2017-12-19

## 2017-12-19 RX ORDER — IPRATROPIUM/ALBUTEROL SULFATE 18-103MCG
3 AEROSOL WITH ADAPTER (GRAM) INHALATION ONCE
Qty: 0 | Refills: 0 | Status: COMPLETED | OUTPATIENT
Start: 2017-12-19 | End: 2017-12-19

## 2017-12-19 RX ORDER — POTASSIUM CHLORIDE 20 MEQ
40 PACKET (EA) ORAL ONCE
Qty: 0 | Refills: 0 | Status: COMPLETED | OUTPATIENT
Start: 2017-12-19 | End: 2017-12-19

## 2017-12-19 RX ORDER — ACETAMINOPHEN 500 MG
1000 TABLET ORAL ONCE
Qty: 0 | Refills: 0 | Status: COMPLETED | OUTPATIENT
Start: 2017-12-19 | End: 2017-12-19

## 2017-12-19 RX ADMIN — Medication 40 MILLIEQUIVALENT(S): at 19:19

## 2017-12-19 RX ADMIN — Medication 3 MILLILITER(S): at 14:32

## 2017-12-19 RX ADMIN — Medication 400 MILLIGRAM(S): at 19:34

## 2017-12-19 RX ADMIN — SODIUM CHLORIDE 1000 MILLILITER(S): 9 INJECTION INTRAMUSCULAR; INTRAVENOUS; SUBCUTANEOUS at 18:45

## 2017-12-19 NOTE — ED PROVIDER NOTE - MEDICAL DECISION MAKING DETAILS
62F presenting with left flank pain x 3 days with associated dysuria. DDx inclutes UTI vs kidney stones. Patient complaining of vague cp, and recovering URI with sob, cough, sore throat. HEART score 2. Will obtain cbc, cmp, vbg, ct abd/pelvis non con, cxr, ua, uc and reassess. 62F presenting with left flank pain x 3 days with associated dysuria. DDx includes UTI vs kidney stones. Patient complaining of vague cp, and recovering URI with sob, cough, sore throat. HEART score 2. Will obtain cbc, cmp, vbg, ct abd/pelvis non con, cxr, ua, uc and reassess.

## 2017-12-19 NOTE — ED ADULT TRIAGE NOTE - CHIEF COMPLAINT QUOTE
Brought in from [facility] for left flank pain radiating to left abdominal pain and diarrhea x 1 day and sob, productive cough. Denies nausea/vomiting. Pt is poor historian.

## 2017-12-19 NOTE — ED PROVIDER NOTE - PROGRESS NOTE DETAILS
Rec'd signout on this pt from Dr. Jasmine:  61yo F here for L flank pain X 3 days. Mostly unremarkable labs, negative cxr and negative CT A/P. Awaiting straight cath for UA. If not UTI, most likely msk pain and likely DC back to NH.  -Dr. Watson NIRALI Alex- Pt feeling better after ER stay, CT neg for acute pathology, labs and UA wnl;. Pt stable for return back to nursing home.

## 2017-12-19 NOTE — ED ADULT TRIAGE NOTE - NS ED TRIAGE AVPU SCALE
Verbal - The patient responds to verbal stimuli by opening their eyes when someone speaks to them. The patient is not fully oriented to time, place, or person. Alert-The patient is alert, awake and responds to voice. The patient is oriented to time, place, and person. The triage nurse is able to obtain subjective information.

## 2017-12-19 NOTE — ED PROVIDER NOTE - ATTENDING CONTRIBUTION TO CARE
Pt was seen and evaluated by me. Pt arrived fron Nursing Home for left flank pain X 3 days. Pt states having some left flank pain over the past 3 days. Pt denies any fever, chills, nausea, vomiting, or diarrhea. Pt wears a diaper for urine. Pt admits to a cough with history of asthma. Lungs CTA b/l. RRR. Abd soft with mild left flank tenderness. Pt was seen and evaluated by me. Pt arrived from Nursing Home for left flank pain X 3 days. Pt states having some left flank pain over the past 3 days. Pt denies any fever, chills, nausea, vomiting, or diarrhea. Pt wears a diaper for urine. Pt admits to a cough with history of asthma. Lungs CTA b/l. RRR. Abd soft with mild left flank tenderness.

## 2017-12-19 NOTE — ED PROVIDER NOTE - OBJECTIVE STATEMENT
62F with PMH GERD, COPD, HTN, depression presenting from NH, poor historian presenting with left sided flank pain x 3 days with associated dysuria, denies prior hx of stones. Endorses that she is recovering from a cold and has been having some sob, cough, sore throat x 1 week which has been improving, with course of zpack last week. Also complaining of vague chest discomfort, 3 episodes of watery stool, and chronic back pain issues. Denies fever, chills, n/v/d, headache, dizziness.

## 2017-12-21 LAB
BACTERIA UR CULT: SIGNIFICANT CHANGE UP
SPECIMEN SOURCE: SIGNIFICANT CHANGE UP

## 2018-01-05 ENCOUNTER — EMERGENCY (EMERGENCY)
Facility: HOSPITAL | Age: 63
LOS: 1 days | Discharge: HOME | End: 2018-01-05
Attending: EMERGENCY MEDICINE | Admitting: EMERGENCY MEDICINE
Payer: MEDICAID

## 2018-01-05 VITALS
OXYGEN SATURATION: 98 % | SYSTOLIC BLOOD PRESSURE: 130 MMHG | DIASTOLIC BLOOD PRESSURE: 80 MMHG | HEART RATE: 84 BPM | RESPIRATION RATE: 16 BRPM

## 2018-01-05 VITALS
OXYGEN SATURATION: 98 % | SYSTOLIC BLOOD PRESSURE: 168 MMHG | DIASTOLIC BLOOD PRESSURE: 92 MMHG | TEMPERATURE: 99 F | RESPIRATION RATE: 16 BRPM | HEART RATE: 83 BPM

## 2018-01-05 DIAGNOSIS — Z96.659 PRESENCE OF UNSPECIFIED ARTIFICIAL KNEE JOINT: Chronic | ICD-10-CM

## 2018-01-05 LAB
ALBUMIN SERPL ELPH-MCNC: 4.2 G/DL — SIGNIFICANT CHANGE UP (ref 3.3–5)
ALP SERPL-CCNC: 125 U/L — HIGH (ref 40–120)
ALT FLD-CCNC: 13 U/L RC — SIGNIFICANT CHANGE UP (ref 10–45)
ANION GAP SERPL CALC-SCNC: 11 MMOL/L — SIGNIFICANT CHANGE UP (ref 5–17)
APPEARANCE UR: CLEAR — SIGNIFICANT CHANGE UP
AST SERPL-CCNC: 13 U/L — SIGNIFICANT CHANGE UP (ref 10–40)
BILIRUB SERPL-MCNC: 0.3 MG/DL — SIGNIFICANT CHANGE UP (ref 0.2–1.2)
BILIRUB UR-MCNC: NEGATIVE — SIGNIFICANT CHANGE UP
BUN SERPL-MCNC: 20 MG/DL — SIGNIFICANT CHANGE UP (ref 7–23)
CALCIUM SERPL-MCNC: 9.8 MG/DL — SIGNIFICANT CHANGE UP (ref 8.4–10.5)
CHLORIDE SERPL-SCNC: 105 MMOL/L — SIGNIFICANT CHANGE UP (ref 96–108)
CO2 SERPL-SCNC: 23 MMOL/L — SIGNIFICANT CHANGE UP (ref 22–31)
COLOR SPEC: SIGNIFICANT CHANGE UP
CREAT SERPL-MCNC: 1.21 MG/DL — SIGNIFICANT CHANGE UP (ref 0.5–1.3)
DIFF PNL FLD: NEGATIVE — SIGNIFICANT CHANGE UP
EPI CELLS # UR: SIGNIFICANT CHANGE UP /HPF
GLUCOSE SERPL-MCNC: 103 MG/DL — HIGH (ref 70–99)
GLUCOSE UR QL: NEGATIVE — SIGNIFICANT CHANGE UP
HCT VFR BLD CALC: 43.3 % — SIGNIFICANT CHANGE UP (ref 34.5–45)
HGB BLD-MCNC: 14.2 G/DL — SIGNIFICANT CHANGE UP (ref 11.5–15.5)
KETONES UR-MCNC: NEGATIVE — SIGNIFICANT CHANGE UP
LEUKOCYTE ESTERASE UR-ACNC: NEGATIVE — SIGNIFICANT CHANGE UP
LIDOCAIN IGE QN: 20 U/L — SIGNIFICANT CHANGE UP (ref 7–60)
MCHC RBC-ENTMCNC: 30.1 PG — SIGNIFICANT CHANGE UP (ref 27–34)
MCHC RBC-ENTMCNC: 32.9 GM/DL — SIGNIFICANT CHANGE UP (ref 32–36)
MCV RBC AUTO: 91.5 FL — SIGNIFICANT CHANGE UP (ref 80–100)
NITRITE UR-MCNC: NEGATIVE — SIGNIFICANT CHANGE UP
PH UR: 6.5 — SIGNIFICANT CHANGE UP (ref 5–8)
PLATELET # BLD AUTO: 147 K/UL — LOW (ref 150–400)
POTASSIUM SERPL-MCNC: 4.1 MMOL/L — SIGNIFICANT CHANGE UP (ref 3.5–5.3)
POTASSIUM SERPL-SCNC: 4.1 MMOL/L — SIGNIFICANT CHANGE UP (ref 3.5–5.3)
PROT SERPL-MCNC: 6.9 G/DL — SIGNIFICANT CHANGE UP (ref 6–8.3)
PROT UR-MCNC: NEGATIVE — SIGNIFICANT CHANGE UP
RBC # BLD: 4.73 M/UL — SIGNIFICANT CHANGE UP (ref 3.8–5.2)
RBC # FLD: 12.6 % — SIGNIFICANT CHANGE UP (ref 10.3–14.5)
SODIUM SERPL-SCNC: 139 MMOL/L — SIGNIFICANT CHANGE UP (ref 135–145)
SP GR SPEC: 1.01 — SIGNIFICANT CHANGE UP (ref 1.01–1.02)
UROBILINOGEN FLD QL: NEGATIVE — SIGNIFICANT CHANGE UP
WBC # BLD: 6.2 K/UL — SIGNIFICANT CHANGE UP (ref 3.8–10.5)
WBC # FLD AUTO: 6.2 K/UL — SIGNIFICANT CHANGE UP (ref 3.8–10.5)
WBC UR QL: SIGNIFICANT CHANGE UP /HPF (ref 0–5)

## 2018-01-05 PROCEDURE — 80053 COMPREHEN METABOLIC PANEL: CPT

## 2018-01-05 PROCEDURE — 81001 URINALYSIS AUTO W/SCOPE: CPT

## 2018-01-05 PROCEDURE — 82550 ASSAY OF CK (CPK): CPT

## 2018-01-05 PROCEDURE — 87086 URINE CULTURE/COLONY COUNT: CPT

## 2018-01-05 PROCEDURE — 85027 COMPLETE CBC AUTOMATED: CPT

## 2018-01-05 PROCEDURE — 84484 ASSAY OF TROPONIN QUANT: CPT

## 2018-01-05 PROCEDURE — 99283 EMERGENCY DEPT VISIT LOW MDM: CPT | Mod: 25

## 2018-01-05 PROCEDURE — 93005 ELECTROCARDIOGRAM TRACING: CPT

## 2018-01-05 PROCEDURE — 99285 EMERGENCY DEPT VISIT HI MDM: CPT

## 2018-01-05 PROCEDURE — 83690 ASSAY OF LIPASE: CPT

## 2018-01-05 PROCEDURE — 82553 CREATINE MB FRACTION: CPT

## 2018-01-05 NOTE — ED ADULT NURSE NOTE - OBJECTIVE STATEMENT
62 year old female presents to ED via EMS from nursing home complaining of left flank pain since last night. History of GERD, depression, anxiety, COPD, HTN, stroke, colitis, cerebral palsy. Patient states she vomited this morning, however endorses vomiting every day is typical for her. States she has had similar pain in past with no diagnosis for reason why she has the pain. Denies HA, CP, abd pain, nausea, diarrhea, dysuria, hematuria. Patient undressed and placed into gown, call bell in hand and side rails up with bed in lowest position for safety. blanket provided. Comfort and safety provided.

## 2018-01-05 NOTE — ED PROVIDER NOTE - PLAN OF CARE
- Your L sided flank pain and chest wall pain is likely musculoskeletal in nature. It is recommended that you increase your physical activity. It is recommended that you get out of your wheelchair every multiple times a day, every 2-4 hours. Be sure to participate in stretching, physical therapy at the nursing home, and light to moderate exercise.   - Please follow up with your primary care doctor within the next 7 days.  - If you experience worsening or severe flank pain, chest pain, fever, chills, blood in the urine, palpitations, shortness of breath, please return to the ED for urgent medical re-evaluation.

## 2018-01-05 NOTE — ED PROVIDER NOTE - PHYSICAL EXAMINATION
Choudhary:  General: No distress.  Mentation at baseline.   HEENT: WNL  Chest/Lungs: CTAB, No wheeze, No retractions, No increased work of breathing, Normal rate  Heart: S1S2 RRR, No M/R/G, Pules equal Bilaterally in upper and lower extremities distally  Abd: soft, NT/ND, No guarding, No rebound.  No hernias, no palpable masses.  Extrem: FROM in all joints, no significant edema noted, No ulcers.  Cap refil < 2sec.  Skin: No rash noted, warm dry.  Neuro:  Grossly normal.  No difficulty ambulating. No focal deficits.  Psychiatric: No evidence of delusions. No SI/HI.

## 2018-01-05 NOTE — ED PROVIDER NOTE - OBJECTIVE STATEMENT
62 year old F w/ COPD, HTN, anxiety, depression, GERD presenting from NH with L flank pain. Sharp L sided pain starting below L breast radiating down to I/L leg, began at 4 pm yesterday, constant since onset, 9/10 in severity. No chest pain, nausea, vomiting, diaphoresis. No urinary symptoms, diarrhea, constipation, fever, or chills. No shortness of breath or coughing. Patient she hasn't eaten since last night due to pain. Patient is [id number] of urine and wears a diaper at baseline. 62 year old F w/ COPD, HTN, anxiety, depression, GERD presenting from NH with L flank pain. Sharp L sided pain starting below L breast radiating down to I/L leg, began at 4 pm yesterday, constant since onset, 9/10 in severity. No chest pain, nausea, vomiting, diaphoresis. No urinary symptoms, diarrhea, constipation, fever, or chills. No shortness of breath or coughing. Patient she hasn't eaten since last night due to pain. Patient is [id number] of urine and wears a diaper at baseline.    Choudhary:  Left sided pain in pt from NH.  pt does not seem to be in pain C/W level 10 pain  admits to not feeeling like NH staff allows her to ambulate and give autonomy

## 2018-01-05 NOTE — ED PROVIDER NOTE - ATTENDING CONTRIBUTION TO CARE
Kenrick:  I have independently evaluated the patient and have documented in the appropriate sections above.  I agree with the exam and plan as noted above.

## 2018-01-05 NOTE — ED PROVIDER NOTE - CARE PLAN
Principal Discharge DX:	Musculoskeletal pain  Instructions for follow-up, activity and diet:	- Your L sided flank pain and chest wall pain is likely musculoskeletal in nature. It is recommended that you increase your physical activity. It is recommended that you get out of your wheelchair every multiple times a day, every 2-4 hours. Be sure to participate in stretching, physical therapy at the nursing home, and light to moderate exercise.   - Please follow up with your primary care doctor within the next 7 days.  - If you experience worsening or severe flank pain, chest pain, fever, chills, blood in the urine, palpitations, shortness of breath, please return to the ED for urgent medical re-evaluation.

## 2018-01-06 LAB
CULTURE RESULTS: NO GROWTH — SIGNIFICANT CHANGE UP
SPECIMEN SOURCE: SIGNIFICANT CHANGE UP

## 2018-01-13 ENCOUNTER — EMERGENCY (EMERGENCY)
Facility: HOSPITAL | Age: 63
LOS: 1 days | Discharge: HOME | End: 2018-01-13
Attending: EMERGENCY MEDICINE | Admitting: EMERGENCY MEDICINE
Payer: MEDICAID

## 2018-01-13 VITALS
TEMPERATURE: 98 F | HEART RATE: 84 BPM | DIASTOLIC BLOOD PRESSURE: 77 MMHG | OXYGEN SATURATION: 100 % | RESPIRATION RATE: 16 BRPM | SYSTOLIC BLOOD PRESSURE: 153 MMHG

## 2018-01-13 DIAGNOSIS — Z96.659 PRESENCE OF UNSPECIFIED ARTIFICIAL KNEE JOINT: Chronic | ICD-10-CM

## 2018-01-13 PROCEDURE — 73630 X-RAY EXAM OF FOOT: CPT | Mod: 26,LT

## 2018-01-13 PROCEDURE — 99285 EMERGENCY DEPT VISIT HI MDM: CPT | Mod: 25

## 2018-01-13 PROCEDURE — 73700 CT LOWER EXTREMITY W/O DYE: CPT | Mod: 26,LT

## 2018-01-13 PROCEDURE — 73610 X-RAY EXAM OF ANKLE: CPT | Mod: 26,LT

## 2018-01-13 PROCEDURE — 29515 APPLICATION SHORT LEG SPLINT: CPT

## 2018-01-13 RX ORDER — OXYCODONE HYDROCHLORIDE 5 MG/1
5 TABLET ORAL ONCE
Qty: 0 | Refills: 0 | Status: DISCONTINUED | OUTPATIENT
Start: 2018-01-13 | End: 2018-01-13

## 2018-01-13 RX ADMIN — OXYCODONE HYDROCHLORIDE 5 MILLIGRAM(S): 5 TABLET ORAL at 23:15

## 2018-01-13 RX ADMIN — OXYCODONE HYDROCHLORIDE 5 MILLIGRAM(S): 5 TABLET ORAL at 21:48
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
## 2018-01-13 NOTE — ED PROVIDER NOTE - PROGRESS NOTE DETAILS
ATTG: still with pain on foot, placed in splint and ordered ct foot. awaiting results, endorsed to Dr. Zhao to follow up. consider dc home if no sig fx  and close ortho follow up. patient states she has an orthopedist. MAGALI Zhao MD : pt endorsed to me pending ct of foot to r/o midfoot/ lisfranc injury. ct neg w recons. pt already splinted, chronically wheelchair bound w assists for transfer. additional verbal instructions regarding diagnosis, return precautions and follow up plan given to pt and/or family.
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
## 2018-01-13 NOTE — ED PROVIDER NOTE - PHYSICAL EXAMINATION
NAD, VSS, Afebrile, B/L diminished lung sounds, no wheezing. No spinal tender, B/L pitting edema, +left generalized metatarsal swelling/ tender, diminished ROMs, Left ankle medial/ lat mal tender without obvious swelling, N/V- intact.

## 2018-01-14 VITALS — DIASTOLIC BLOOD PRESSURE: 103 MMHG | HEART RATE: 72 BPM | SYSTOLIC BLOOD PRESSURE: 156 MMHG

## 2018-01-14 RX ORDER — OXYCODONE HYDROCHLORIDE 5 MG/1
1 TABLET ORAL
Qty: 12 | Refills: 0 | OUTPATIENT
Start: 2018-01-14 | End: 2018-01-16

## 2018-01-14 RX ORDER — ACETAMINOPHEN 500 MG
650 TABLET ORAL ONCE
Qty: 0 | Refills: 0 | Status: COMPLETED | OUTPATIENT
Start: 2018-01-14 | End: 2018-01-14

## 2018-01-14 NOTE — ED PROCEDURE NOTE - CPROC ED POST PROC CARE GUIDE1
Elevate the injured extremity as instructed./Instructed patient/caregiver to follow-up with primary care physician./Verbal/written post procedure instructions were given to patient/caregiver./Instructed patient/caregiver regarding signs and symptoms of infection./Keep the cast/splint/dressing clean and dry.

## 2018-01-15 ENCOUNTER — INPATIENT (INPATIENT)
Facility: HOSPITAL | Age: 63
LOS: 0 days | Discharge: HOME | DRG: 556 | End: 2018-01-16
Attending: HOSPITALIST | Admitting: HOSPITALIST
Payer: MEDICAID

## 2018-01-15 VITALS — SYSTOLIC BLOOD PRESSURE: 154 MMHG | DIASTOLIC BLOOD PRESSURE: 85 MMHG | RESPIRATION RATE: 18 BRPM | HEART RATE: 77 BPM

## 2018-01-15 DIAGNOSIS — I10 ESSENTIAL (PRIMARY) HYPERTENSION: ICD-10-CM

## 2018-01-15 DIAGNOSIS — F32.9 MAJOR DEPRESSIVE DISORDER, SINGLE EPISODE, UNSPECIFIED: ICD-10-CM

## 2018-01-15 DIAGNOSIS — R51 HEADACHE: ICD-10-CM

## 2018-01-15 DIAGNOSIS — M79.606 PAIN IN LEG, UNSPECIFIED: ICD-10-CM

## 2018-01-15 DIAGNOSIS — Z96.659 PRESENCE OF UNSPECIFIED ARTIFICIAL KNEE JOINT: Chronic | ICD-10-CM

## 2018-01-15 DIAGNOSIS — Z79.899 OTHER LONG TERM (CURRENT) DRUG THERAPY: ICD-10-CM

## 2018-01-15 DIAGNOSIS — K21.9 GASTRO-ESOPHAGEAL REFLUX DISEASE WITHOUT ESOPHAGITIS: ICD-10-CM

## 2018-01-15 LAB
ALBUMIN SERPL ELPH-MCNC: 4 G/DL — SIGNIFICANT CHANGE UP (ref 3.3–5)
ALP SERPL-CCNC: 110 U/L — SIGNIFICANT CHANGE UP (ref 40–120)
ALT FLD-CCNC: 14 U/L RC — SIGNIFICANT CHANGE UP (ref 10–45)
ANION GAP SERPL CALC-SCNC: 12 MMOL/L — SIGNIFICANT CHANGE UP (ref 5–17)
AST SERPL-CCNC: 20 U/L — SIGNIFICANT CHANGE UP (ref 10–40)
BASOPHILS # BLD AUTO: 0 K/UL — SIGNIFICANT CHANGE UP (ref 0–0.2)
BASOPHILS NFR BLD AUTO: 0.5 % — SIGNIFICANT CHANGE UP (ref 0–2)
BILIRUB SERPL-MCNC: 0.2 MG/DL — SIGNIFICANT CHANGE UP (ref 0.2–1.2)
BUN SERPL-MCNC: 22 MG/DL — SIGNIFICANT CHANGE UP (ref 7–23)
CALCIUM SERPL-MCNC: 9.6 MG/DL — SIGNIFICANT CHANGE UP (ref 8.4–10.5)
CHLORIDE SERPL-SCNC: 106 MMOL/L — SIGNIFICANT CHANGE UP (ref 96–108)
CO2 SERPL-SCNC: 24 MMOL/L — SIGNIFICANT CHANGE UP (ref 22–31)
CREAT SERPL-MCNC: 1.37 MG/DL — HIGH (ref 0.5–1.3)
EOSINOPHIL # BLD AUTO: 0.4 K/UL — SIGNIFICANT CHANGE UP (ref 0–0.5)
EOSINOPHIL NFR BLD AUTO: 4.3 % — SIGNIFICANT CHANGE UP (ref 0–6)
GLUCOSE SERPL-MCNC: 105 MG/DL — HIGH (ref 70–99)
HCT VFR BLD CALC: 37.4 % — SIGNIFICANT CHANGE UP (ref 34.5–45)
HGB BLD-MCNC: 12.6 G/DL — SIGNIFICANT CHANGE UP (ref 11.5–15.5)
LYMPHOCYTES # BLD AUTO: 2.5 K/UL — SIGNIFICANT CHANGE UP (ref 1–3.3)
LYMPHOCYTES # BLD AUTO: 27.7 % — SIGNIFICANT CHANGE UP (ref 13–44)
MCHC RBC-ENTMCNC: 30.1 PG — SIGNIFICANT CHANGE UP (ref 27–34)
MCHC RBC-ENTMCNC: 33.8 GM/DL — SIGNIFICANT CHANGE UP (ref 32–36)
MCV RBC AUTO: 88.9 FL — SIGNIFICANT CHANGE UP (ref 80–100)
MONOCYTES # BLD AUTO: 0.8 K/UL — SIGNIFICANT CHANGE UP (ref 0–0.9)
MONOCYTES NFR BLD AUTO: 8.5 % — SIGNIFICANT CHANGE UP (ref 2–14)
NEUTROPHILS # BLD AUTO: 5.4 K/UL — SIGNIFICANT CHANGE UP (ref 1.8–7.4)
NEUTROPHILS NFR BLD AUTO: 59 % — SIGNIFICANT CHANGE UP (ref 43–77)
PLATELET # BLD AUTO: 239 K/UL — SIGNIFICANT CHANGE UP (ref 150–400)
POTASSIUM SERPL-MCNC: 4 MMOL/L — SIGNIFICANT CHANGE UP (ref 3.5–5.3)
POTASSIUM SERPL-SCNC: 4 MMOL/L — SIGNIFICANT CHANGE UP (ref 3.5–5.3)
PROT SERPL-MCNC: 6.8 G/DL — SIGNIFICANT CHANGE UP (ref 6–8.3)
RBC # BLD: 4.21 M/UL — SIGNIFICANT CHANGE UP (ref 3.8–5.2)
RBC # FLD: 12.4 % — SIGNIFICANT CHANGE UP (ref 10.3–14.5)
SODIUM SERPL-SCNC: 142 MMOL/L — SIGNIFICANT CHANGE UP (ref 135–145)
WBC # BLD: 9.1 K/UL — SIGNIFICANT CHANGE UP (ref 3.8–10.5)
WBC # FLD AUTO: 9.1 K/UL — SIGNIFICANT CHANGE UP (ref 3.8–10.5)

## 2018-01-15 PROCEDURE — 99222 1ST HOSP IP/OBS MODERATE 55: CPT

## 2018-01-15 PROCEDURE — 93971 EXTREMITY STUDY: CPT | Mod: 26

## 2018-01-15 PROCEDURE — 93010 ELECTROCARDIOGRAM REPORT: CPT

## 2018-01-15 PROCEDURE — 73590 X-RAY EXAM OF LOWER LEG: CPT | Mod: 26,LT

## 2018-01-15 PROCEDURE — 73630 X-RAY EXAM OF FOOT: CPT | Mod: 26,LT

## 2018-01-15 PROCEDURE — 99285 EMERGENCY DEPT VISIT HI MDM: CPT | Mod: 25

## 2018-01-15 RX ORDER — ACETAMINOPHEN 500 MG
975 TABLET ORAL EVERY 12 HOURS
Qty: 0 | Refills: 0 | Status: DISCONTINUED | OUTPATIENT
Start: 2018-01-15 | End: 2018-01-16

## 2018-01-15 RX ORDER — LIDOCAINE 4 G/100G
1 CREAM TOPICAL DAILY
Qty: 0 | Refills: 0 | Status: DISCONTINUED | OUTPATIENT
Start: 2018-01-15 | End: 2018-01-16

## 2018-01-15 RX ORDER — OXYCODONE HYDROCHLORIDE 5 MG/1
5 TABLET ORAL ONCE
Qty: 0 | Refills: 0 | Status: DISCONTINUED | OUTPATIENT
Start: 2018-01-15 | End: 2018-01-15

## 2018-01-15 RX ORDER — TRAZODONE HCL 50 MG
100 TABLET ORAL AT BEDTIME
Qty: 0 | Refills: 0 | Status: DISCONTINUED | OUTPATIENT
Start: 2018-01-15 | End: 2018-01-16

## 2018-01-15 RX ORDER — OXYCODONE AND ACETAMINOPHEN 5; 325 MG/1; MG/1
1 TABLET ORAL ONCE
Qty: 0 | Refills: 0 | Status: DISCONTINUED | OUTPATIENT
Start: 2018-01-15 | End: 2018-01-15

## 2018-01-15 RX ORDER — TRAMADOL HYDROCHLORIDE 50 MG/1
25 TABLET ORAL THREE TIMES A DAY
Qty: 0 | Refills: 0 | Status: DISCONTINUED | OUTPATIENT
Start: 2018-01-15 | End: 2018-01-16

## 2018-01-15 RX ORDER — HEPARIN SODIUM 5000 [USP'U]/ML
5000 INJECTION INTRAVENOUS; SUBCUTANEOUS EVERY 8 HOURS
Qty: 0 | Refills: 0 | Status: DISCONTINUED | OUTPATIENT
Start: 2018-01-15 | End: 2018-01-16

## 2018-01-15 RX ORDER — SUCRALFATE 1 G
1 TABLET ORAL
Qty: 0 | Refills: 0 | Status: DISCONTINUED | OUTPATIENT
Start: 2018-01-15 | End: 2018-01-16

## 2018-01-15 RX ORDER — FAMOTIDINE 10 MG/ML
20 INJECTION INTRAVENOUS
Qty: 0 | Refills: 0 | Status: DISCONTINUED | OUTPATIENT
Start: 2018-01-15 | End: 2018-01-16

## 2018-01-15 RX ORDER — BUDESONIDE AND FORMOTEROL FUMARATE DIHYDRATE 160; 4.5 UG/1; UG/1
2 AEROSOL RESPIRATORY (INHALATION)
Qty: 0 | Refills: 0 | Status: DISCONTINUED | OUTPATIENT
Start: 2018-01-15 | End: 2018-01-15

## 2018-01-15 RX ORDER — METOCLOPRAMIDE HCL 10 MG
5 TABLET ORAL
Qty: 0 | Refills: 0 | Status: DISCONTINUED | OUTPATIENT
Start: 2018-01-15 | End: 2018-01-16

## 2018-01-15 RX ORDER — CITALOPRAM 10 MG/1
20 TABLET, FILM COATED ORAL DAILY
Qty: 0 | Refills: 0 | Status: DISCONTINUED | OUTPATIENT
Start: 2018-01-15 | End: 2018-01-16

## 2018-01-15 RX ORDER — FLUTICASONE PROPIONATE 50 MCG
1 SPRAY, SUSPENSION NASAL DAILY
Qty: 0 | Refills: 0 | Status: DISCONTINUED | OUTPATIENT
Start: 2018-01-15 | End: 2018-01-15

## 2018-01-15 RX ORDER — LANOLIN ALCOHOL/MO/W.PET/CERES
3 CREAM (GRAM) TOPICAL AT BEDTIME
Qty: 0 | Refills: 0 | Status: DISCONTINUED | OUTPATIENT
Start: 2018-01-15 | End: 2018-01-16

## 2018-01-15 RX ORDER — LACTULOSE 10 G/15ML
20 SOLUTION ORAL EVERY 8 HOURS
Qty: 0 | Refills: 0 | Status: DISCONTINUED | OUTPATIENT
Start: 2018-01-15 | End: 2018-01-16

## 2018-01-15 RX ORDER — ALBUTEROL 90 UG/1
2 AEROSOL, METERED ORAL EVERY 6 HOURS
Qty: 0 | Refills: 0 | Status: DISCONTINUED | OUTPATIENT
Start: 2018-01-15 | End: 2018-01-16

## 2018-01-15 RX ORDER — TOPIRAMATE 25 MG
50 TABLET ORAL AT BEDTIME
Qty: 0 | Refills: 0 | Status: DISCONTINUED | OUTPATIENT
Start: 2018-01-15 | End: 2018-01-16

## 2018-01-15 RX ORDER — THIAMINE MONONITRATE (VIT B1) 100 MG
100 TABLET ORAL DAILY
Qty: 0 | Refills: 0 | Status: DISCONTINUED | OUTPATIENT
Start: 2018-01-15 | End: 2018-01-16

## 2018-01-15 RX ORDER — SIMVASTATIN 20 MG/1
40 TABLET, FILM COATED ORAL AT BEDTIME
Qty: 0 | Refills: 0 | Status: DISCONTINUED | OUTPATIENT
Start: 2018-01-15 | End: 2018-01-16

## 2018-01-15 RX ADMIN — Medication 100 MILLIGRAM(S): at 22:32

## 2018-01-15 RX ADMIN — FAMOTIDINE 20 MILLIGRAM(S): 10 INJECTION INTRAVENOUS at 18:50

## 2018-01-15 RX ADMIN — CITALOPRAM 20 MILLIGRAM(S): 10 TABLET, FILM COATED ORAL at 13:34

## 2018-01-15 RX ADMIN — Medication 50 MILLIGRAM(S): at 22:32

## 2018-01-15 RX ADMIN — Medication 975 MILLIGRAM(S): at 18:48

## 2018-01-15 RX ADMIN — HEPARIN SODIUM 5000 UNIT(S): 5000 INJECTION INTRAVENOUS; SUBCUTANEOUS at 13:34

## 2018-01-15 RX ADMIN — Medication 1 GRAM(S): at 22:31

## 2018-01-15 RX ADMIN — SIMVASTATIN 40 MILLIGRAM(S): 20 TABLET, FILM COATED ORAL at 22:30

## 2018-01-15 RX ADMIN — Medication 5 MILLIGRAM(S): at 18:48

## 2018-01-15 RX ADMIN — TRAMADOL HYDROCHLORIDE 25 MILLIGRAM(S): 50 TABLET ORAL at 14:10

## 2018-01-15 RX ADMIN — OXYCODONE HYDROCHLORIDE 5 MILLIGRAM(S): 5 TABLET ORAL at 05:00

## 2018-01-15 RX ADMIN — OXYCODONE HYDROCHLORIDE 5 MILLIGRAM(S): 5 TABLET ORAL at 03:47

## 2018-01-15 RX ADMIN — Medication 975 MILLIGRAM(S): at 19:15

## 2018-01-15 RX ADMIN — TRAMADOL HYDROCHLORIDE 25 MILLIGRAM(S): 50 TABLET ORAL at 13:34

## 2018-01-15 RX ADMIN — Medication 3 MILLIGRAM(S): at 22:31

## 2018-01-15 NOTE — H&P ADULT - NSHPPHYSICALEXAM_GEN_ALL_CORE
GENERAL: Laying comfortably in bed, josr bear on lap. +Glasses. No hearing aids. No bottom teeth. AO, seems comfortable.   HEAD:  Atraumatic, Normocephalic though she has pain to palpation of b/l temples and parietal regions.   EYES: EOMI, PERRLA, conjunctiva and sclera clear  NECK: Supple, No JVD  CHEST/LUNG: Clear to auscultation bilaterally; No wheeze  HEART: Regular rate and rhythm; No murmurs, rubs, or gallops  ABDOMEN: Soft, Nontender, Nondistended; Bowel sounds present  EXTREMITIES:  2+ Peripheral Pulses, b/l LE edema w/ chronic venous stasis discoloration. LLE w/ tactile allodynia on anterior palpation. No pitting. 2+ pulses, WWP.  PSYCH: AAOx3  NEUROLOGY: non-focal, exam limited by LE pain   SKIN: erythematous rash anteriorly of both legs .

## 2018-01-15 NOTE — ED PROVIDER NOTE - CONSTITUTIONAL, MLM
normal... Well appearing, well nourished, awake, alert, oriented to person, place, time/situation and in no apparent distress. Holding josr bear on lap.
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
## 2018-01-15 NOTE — H&P ADULT - PROBLEM SELECTOR PLAN 1
- CT w/ out fracture, await MRI for occult fracture  - tylenol, lidocaine patch. Tramadol for severe, prefer not to use opioid in high risk patient w/ polypharm. No objective etiology of pain as of now.   - PT  - SW consult to discuss transfer of facility given multiple recent visits and concern for overall dissatisfaction w/ her placement.

## 2018-01-15 NOTE — H&P ADULT - ATTENDING COMMENTS
case d/w pharmacy, RN at home facility. Tried preferred contact [facility] 2, left voicemail. case d/w pharmacy, RN at home facility. Tried preferred contact [facility] x 2, left voicemail.  - COPD at baseline w/ no increased cough. Verified med list w/ pharmacy, no inhalers. Will therefore hold them here.  - hsq 5000 tid  - anticipate d/c soon if she's willing to back to her facility. Would not d/c home on opioids.  - EKG for polypharmacy please. case d/w pharmacy, RN at home facility. Tried preferred contact [facility] x 2, left voicemail.  - COPD at baseline w/ no increased cough. Verified med list w/ pharmacy, no inhalers. Will therefore hold them here.  - hsq 5000 tid  - anticipate d/c soon if she's willing to back to her facility. Would not d/c home on opioids, if current pain regimen is inadequate can use low dose oxy temporarily though trying to hold for now to minimize risk of delirium + worsening constipation.   - EKG for polypharmacy please.  - d/w [facility].

## 2018-01-15 NOTE — H&P ADULT - PROBLEM SELECTOR PLAN 5
- big problem here, med rec w/ pharmacy and home RN: patient is on many PRNs and standing meds. - problem here, med rec w/ pharmacy and home RN: patient is on many PRNs and standing meds.

## 2018-01-15 NOTE — ED PROVIDER NOTE - OBJECTIVE STATEMENT
62F presenting with headache. Pt took Tylenol and it did not help. Pt came here yesterday for the headache, relieved by Oxycodone and she was better. Pt said Morphine also helped with her headache but the doctor did not give to her. No fever or chills. No diarrhea or constipation. Pt came from the [facility]. 62F presenting with headache and left foot pain. Pt took Tylenol and it did not help. Pt came here yesterday for the headache, relieved by Oxycodone and she was better. Pt said Morphine also helped with her headache but the doctor did not give to her. No photophobia, aphasia, vision changes, or focal weakness. Pt seen yesterday in ED for left foot pain. CT foot negative for acute process. LLE placed in posterior splint. No fever or chills. No diarrhea or constipation. Pt came from the [facility].

## 2018-01-15 NOTE — ED PROVIDER NOTE - MUSCULOSKELETAL, MLM
LLE: posterior ortho glass splint taken down, skin intact, no erythema, no crepitus, no warmth, pedal pulse +2, no swelling, no blistering, FROM with mild pain

## 2018-01-15 NOTE — H&P ADULT - PROBLEM SELECTOR PLAN 3
- tylenol 1g BID standing.   - cont home metoclopramide 5 mg BID  - warm compress to neck.   - CN exam normal, no unilateral throbbing or n/v or photophobia. Will not treat as migraine, no focal neuro signs or trauma- no need for head imaging.

## 2018-01-15 NOTE — H&P ADULT - NSHPREVIEWOFSYSTEMS_GEN_ALL_CORE
Review of Systems:   CONSTITUTIONAL: No fever.  EYES: No eye pain or discharge.  ENMT:  No sinus or throat pain  NECK: No pain or stiffness  RESPIRATORY: No cough, wheezing, chills or hemoptysis; No shortness of breath  CARDIOVASCULAR: No chest pain, palpitations, dizziness, or leg swelling  GASTROINTESTINAL: No abdominal or epigastric pain. No nausea, vomiting, or hematemesis; No diarrhea or constipation. No melena or hematochezia.  GENITOURINARY: No dysuria or incontinence  NEUROLOGICAL: + headaches, no loss of strength, numbness, or tremors  SKIN: No rashes.  MUSCULOSKELETAL: ++LLE pain  HEME/LYMPH: No easy bruising, or bleeding gums

## 2018-01-15 NOTE — H&P ADULT - PROBLEM SELECTOR PLAN 6
- will cont. extensive regimen here, need to ensure out pt. GI f/u- would benefit from upper GI scope if she hasn't had one yet  - famotidine 20 bid  - sucralfate qid  - maalox prn

## 2018-01-15 NOTE — ED PROVIDER NOTE - MEDICAL DECISION MAKING DETAILS
r/o dvt , cast, return to nursing home. HA without fever of focal neurologic Sx. Sx control with medication. LLE pain chronic without acute fracture or signs of infection. LLE splint taken down. Labs non-actionable. Given persistent pain and inability to ambulate will admit for orthopedic evaluation and PTMary DENTON.
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
## 2018-01-15 NOTE — ED ADULT NURSE REASSESSMENT NOTE - NS ED NURSE REASSESS COMMENT FT1
0705 Report received from night nurse. Pt states she is in too much pain to be discharged home to nursing home. TBA. c/o left lower leg pain 10/10. +redness and swelling. pain with ROM. +pedal pulses. A&Ox3. Fall  risk precautions maintained. Yellow band intact. Redsocks and stretcher tag applied, siderails up. Call bell within reach. IVL intact left hand without sx of infilt

## 2018-01-15 NOTE — H&P ADULT - PROBLEM SELECTOR PLAN 2
- hold lisinopril w/ slight elevation in Cr.  - lasix 20 mg MWF (confirmed w/ pharmacy) at home: holding this for now as she's euvolemic w/ slight drop in GFR.

## 2018-01-15 NOTE — H&P ADULT - NSHPLABSRESULTS_GEN_ALL_CORE
Labs notable for Cr 1.37 (baseline 1.2, both w/ GFR ~40), otherwise no lyte abnormalities, no leukocytosis or CBC abnormality.     Xray L foot:   Limited evaluation of the osseous structures secondary to severe   generalized osteopenia. No acute fractures or dislocations. The left   ankle joint space is maintained with a congruent ankle mortise. There is   severe first tarsometatarsal joint arthrosis evidenced by joint space   narrowing and large amount of dorsal marginal osteophyte production.   Diffuse soft tissue swelling of the left ankle and left foot is noted.    CT leg/foot1/13:  Generalized bone demineralization limits osseous evaluation. No acute   fracture is identified. There is soft tissue swelling greatest dorsal   laterally. There is diffuse atrophy of the visualized muscles at the   ankle and foot. There is first and second tarsometatarsal arthrosis.   There is mild deformity at the fifth metatarsal which base appears   chronic. There is spurring and well-corticated ossific bodies adjacent to   the proximal navicula medially with a least some of the ossifications   within the distal posterior tibial tendon. These findings appear chronic.    Impression: Generalized bone demineralization which limits osseous   evaluation. No acute displaced fracture identified. If there is   persistent clinical concern, MRI can be performed for further sensitivity.    U/S duplex LLE: Neg Labs notable for Cr 1.37 (baseline 1.2, both w/ GFR ~40), otherwise no lyte abnormalities, no leukocytosis or CBC abnormality.     Xray  IMPRESSION:   Generalized bone demineralization limits osseous evaluation.     Left tibia and fibula: There is diffuse soft tissue swelling. There is   diffuse periosteal reaction along the entire tibia and fibular shafts   which is nonspecific.    Left foot: There is a moderate lateral subluxation of the fifth middle   phalanx relative to the fifth proximal phalanx which is   age-indeterminate. Diffuse soft tissue swelling. No cortical erosion or   osseous destruction to suggest osteomyelitis. No acute fracture. There is   dorsal spurring at the tarsometatarsal joints.    CT leg/foot1/13:  Generalized bone demineralization limits osseous evaluation. No acute   fracture is identified. There is soft tissue swelling greatest dorsal   laterally. There is diffuse atrophy of the visualized muscles at the   ankle and foot. There is first and second tarsometatarsal arthrosis.   There is mild deformity at the fifth metatarsal which base appears   chronic. There is spurring and well-corticated ossific bodies adjacent to   the proximal navicula medially with a least some of the ossifications   within the distal posterior tibial tendon. These findings appear chronic.    Impression: Generalized bone demineralization which limits osseous   evaluation. No acute displaced fracture identified. If there is   persistent clinical concern, MRI can be performed for further sensitivity.    U/S duplex LLE: Neg

## 2018-01-16 ENCOUNTER — TRANSCRIPTION ENCOUNTER (OUTPATIENT)
Age: 63
End: 2018-01-16

## 2018-01-16 VITALS
RESPIRATION RATE: 18 BRPM | OXYGEN SATURATION: 95 % | HEART RATE: 80 BPM | SYSTOLIC BLOOD PRESSURE: 158 MMHG | TEMPERATURE: 99 F | DIASTOLIC BLOOD PRESSURE: 90 MMHG

## 2018-01-16 DIAGNOSIS — Z29.9 ENCOUNTER FOR PROPHYLACTIC MEASURES, UNSPECIFIED: ICD-10-CM

## 2018-01-16 LAB
ANION GAP SERPL CALC-SCNC: 13 MMOL/L — SIGNIFICANT CHANGE UP (ref 5–17)
BUN SERPL-MCNC: 16 MG/DL — SIGNIFICANT CHANGE UP (ref 7–23)
CALCIUM SERPL-MCNC: 9.6 MG/DL — SIGNIFICANT CHANGE UP (ref 8.4–10.5)
CHLORIDE SERPL-SCNC: 106 MMOL/L — SIGNIFICANT CHANGE UP (ref 96–108)
CO2 SERPL-SCNC: 21 MMOL/L — LOW (ref 22–31)
CREAT SERPL-MCNC: 1.05 MG/DL — SIGNIFICANT CHANGE UP (ref 0.5–1.3)
GLUCOSE SERPL-MCNC: 101 MG/DL — HIGH (ref 70–99)
POTASSIUM SERPL-MCNC: 4.2 MMOL/L — SIGNIFICANT CHANGE UP (ref 3.5–5.3)
POTASSIUM SERPL-SCNC: 4.2 MMOL/L — SIGNIFICANT CHANGE UP (ref 3.5–5.3)
SODIUM SERPL-SCNC: 140 MMOL/L — SIGNIFICANT CHANGE UP (ref 135–145)

## 2018-01-16 PROCEDURE — 99284 EMERGENCY DEPT VISIT MOD MDM: CPT | Mod: 25

## 2018-01-16 PROCEDURE — 80053 COMPREHEN METABOLIC PANEL: CPT

## 2018-01-16 PROCEDURE — 97162 PT EVAL MOD COMPLEX 30 MIN: CPT

## 2018-01-16 PROCEDURE — 73718 MRI LOWER EXTREMITY W/O DYE: CPT

## 2018-01-16 PROCEDURE — 73700 CT LOWER EXTREMITY W/O DYE: CPT

## 2018-01-16 PROCEDURE — 93005 ELECTROCARDIOGRAM TRACING: CPT

## 2018-01-16 PROCEDURE — 99285 EMERGENCY DEPT VISIT HI MDM: CPT | Mod: 25

## 2018-01-16 PROCEDURE — 99239 HOSP IP/OBS DSCHRG MGMT >30: CPT

## 2018-01-16 PROCEDURE — 76377 3D RENDER W/INTRP POSTPROCES: CPT

## 2018-01-16 PROCEDURE — 73610 X-RAY EXAM OF ANKLE: CPT

## 2018-01-16 PROCEDURE — 80048 BASIC METABOLIC PNL TOTAL CA: CPT

## 2018-01-16 PROCEDURE — 73630 X-RAY EXAM OF FOOT: CPT

## 2018-01-16 PROCEDURE — 29515 APPLICATION SHORT LEG SPLINT: CPT | Mod: LT

## 2018-01-16 PROCEDURE — 73590 X-RAY EXAM OF LOWER LEG: CPT

## 2018-01-16 PROCEDURE — 85027 COMPLETE CBC AUTOMATED: CPT

## 2018-01-16 PROCEDURE — 73718 MRI LOWER EXTREMITY W/O DYE: CPT | Mod: 26,LT

## 2018-01-16 PROCEDURE — 93971 EXTREMITY STUDY: CPT

## 2018-01-16 RX ORDER — LACTULOSE 10 G/15ML
30 SOLUTION ORAL
Qty: 0 | Refills: 0 | DISCHARGE
Start: 2018-01-16

## 2018-01-16 RX ORDER — FUROSEMIDE 40 MG
1 TABLET ORAL
Qty: 0 | Refills: 0 | DISCHARGE
Start: 2018-01-16

## 2018-01-16 RX ORDER — CITALOPRAM 10 MG/1
0 TABLET, FILM COATED ORAL
Qty: 0 | Refills: 0 | COMMUNITY

## 2018-01-16 RX ORDER — LACTULOSE 10 G/15ML
0 SOLUTION ORAL
Qty: 0 | Refills: 0 | COMMUNITY

## 2018-01-16 RX ORDER — FUROSEMIDE 40 MG
0 TABLET ORAL
Qty: 0 | Refills: 0 | COMMUNITY

## 2018-01-16 RX ORDER — ACETAMINOPHEN 500 MG
2 TABLET ORAL
Qty: 0 | Refills: 0 | DISCHARGE
Start: 2018-01-16

## 2018-01-16 RX ORDER — LIDOCAINE 4 G/100G
1 CREAM TOPICAL
Qty: 0 | Refills: 0 | DISCHARGE
Start: 2018-01-16

## 2018-01-16 RX ORDER — CITALOPRAM 10 MG/1
1 TABLET, FILM COATED ORAL
Qty: 0 | Refills: 0 | DISCHARGE
Start: 2018-01-16

## 2018-01-16 RX ADMIN — Medication 1 GRAM(S): at 13:08

## 2018-01-16 RX ADMIN — Medication 100 MILLIGRAM(S): at 13:08

## 2018-01-16 RX ADMIN — LIDOCAINE 1 PATCH: 4 CREAM TOPICAL at 13:01

## 2018-01-16 RX ADMIN — TRAMADOL HYDROCHLORIDE 25 MILLIGRAM(S): 50 TABLET ORAL at 13:12

## 2018-01-16 RX ADMIN — FAMOTIDINE 20 MILLIGRAM(S): 10 INJECTION INTRAVENOUS at 05:17

## 2018-01-16 RX ADMIN — HEPARIN SODIUM 5000 UNIT(S): 5000 INJECTION INTRAVENOUS; SUBCUTANEOUS at 13:01

## 2018-01-16 RX ADMIN — LACTULOSE 20 GRAM(S): 10 SOLUTION ORAL at 13:19

## 2018-01-16 RX ADMIN — TRAMADOL HYDROCHLORIDE 25 MILLIGRAM(S): 50 TABLET ORAL at 14:12

## 2018-01-16 RX ADMIN — HEPARIN SODIUM 5000 UNIT(S): 5000 INJECTION INTRAVENOUS; SUBCUTANEOUS at 05:17

## 2018-01-16 RX ADMIN — CITALOPRAM 20 MILLIGRAM(S): 10 TABLET, FILM COATED ORAL at 13:01

## 2018-01-16 RX ADMIN — Medication 5 MILLIGRAM(S): at 05:17

## 2018-01-16 RX ADMIN — Medication 5 MILLIGRAM(S): at 19:55

## 2018-01-16 NOTE — PROGRESS NOTE ADULT - PROBLEM SELECTOR PLAN 1
CT and MRI both reviewed with radiology.  No fracture, but imaging only captures ankle/foot when patient reports trauma to upper leg (below the knee) with pain radiating down to the foot.  Will order MRI left leg to evaluate for occult fracture.  Continue Tylenol and Lidoderm patch with Tramadol PRN.

## 2018-01-16 NOTE — DISCHARGE NOTE ADULT - PATIENT PORTAL LINK FT
“You can access the [facility] Patient Portal, offered by [facility], by registering with the following website: [email or web address]”

## 2018-01-16 NOTE — PROGRESS NOTE ADULT - PROBLEM SELECTOR PLAN 2
Lisinopril and Lasix currently on hold.  Repeat BMP and monitor BP. Lisinopril and Lasix currently on hold, held on admission in setting of mildy elevated creatinine.  Repeat BMP and monitor BP.
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
## 2018-01-16 NOTE — DISCHARGE NOTE ADULT - CARE PLAN
Principal Discharge DX:	Pain of left lower extremity  Goal:	no fracture  Assessment and plan of treatment:	MRI of tib fib and foot no fracture  follow up with the physician in the facility within this week  continue current medication  return to emergency if worsens  Secondary Diagnosis:	Essential hypertension  Goal:	/80  Assessment and plan of treatment:	Continue the above meds  follow up with the facility physician to see of Lasix needs to be restarted  Secondary Diagnosis:	Anxiety  Goal:	no be monitored at facility  Assessment and plan of treatment:	continue follow up with facility physician  Secondary Diagnosis:	Depression  Goal:	no be monitored at facility  Secondary Diagnosis:	FREDI (acute kidney injury)  Goal:	resolved  Assessment and plan of treatment:	Meds as above and follow up BMP in a few days at the facility
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
## 2018-01-16 NOTE — DISCHARGE NOTE ADULT - MEDICATION SUMMARY - MEDICATIONS TO STOP TAKING
I will STOP taking the medications listed below when I get home from the hospital:    Lasix I will STOP taking the medications listed below when I get home from the hospital:    oxyCODONE 5 mg oral tablet  -- 1 tab(s) by mouth every 6 hours, As Needed -for severe pain MDD:4   -- Caution federal law prohibits the transfer of this drug to any person other  than the person for whom it was prescribed.  It is very important that you take or use this exactly as directed.  Do not skip doses or discontinue unless directed by your doctor.  May cause drowsiness.  Alcohol may intensify this effect.  Use care when operating dangerous machinery.  This prescription cannot be refilled.  Using more of this medication than prescribed may cause serious breathing problems.

## 2018-01-16 NOTE — DISCHARGE NOTE ADULT - PROVIDER TOKENS
FREE:[LAST:[Dr Lozoya],FIRST:[N.],PHONE:[(   )    -],FAX:[(   )    -],ADDRESS:[PCP at [facility]]]

## 2018-01-16 NOTE — PROGRESS NOTE ADULT - SUBJECTIVE AND OBJECTIVE BOX
CC:  Left leg pain    SUBJECTIVE:  Still with left LE pain, radiates from upper leg down to foot.  Pain worse with palpation and bearing weight.  No SOB.  No N/V.  Patient reports that she is eager to return to her facility and doesn't want to lose her bed.  NAD.    MEDICATIONS  (STANDING):  acetaminophen   Tablet. 975 milliGRAM(s) Oral every 12 hours  citalopram 20 milliGRAM(s) Oral daily  famotidine    Tablet 20 milliGRAM(s) Oral two times a day  heparin  Injectable 5000 Unit(s) SubCutaneous every 8 hours  lidocaine   Patch 1 Patch Transdermal daily  melatonin 3 milliGRAM(s) Oral at bedtime  metoclopramide 5 milliGRAM(s) Oral two times a day  simvastatin 40 milliGRAM(s) Oral at bedtime  sucralfate suspension 1 Gram(s) Oral Before meals and at bedtime  thiamine 100 milliGRAM(s) Oral daily  topiramate 50 milliGRAM(s) Oral at bedtime  traZODone 100 milliGRAM(s) Oral at bedtime    MEDICATIONS  (PRN):  ALBUTerol    90 MICROgram(s) HFA Inhaler 2 Puff(s) Inhalation every 6 hours PRN Wheezing  aluminum hydroxide/magnesium hydroxide/simethicone Suspension 30 milliLiter(s) Oral every 6 hours PRN Dyspepsia  lactulose Syrup 20 Gram(s) Oral every 8 hours PRN Constipation  traMADol 25 milliGRAM(s) Oral three times a day PRN Severe Pain (7 - 10)      Vital Signs Last 24 Hrs  T(C): 36.5 (16 Jan 2018 07:37), Max: 37.2 (15 Abhishek 2018 15:25)  T(F): 97.7 (16 Jan 2018 07:37), Max: 98.9 (15 Abhishek 2018 15:25)  HR: 81 (16 Jan 2018 07:37) (80 - 91)  BP: 155/89 (16 Jan 2018 07:37) (154/79 - 178/96)  BP(mean): --  RR: 19 (16 Jan 2018 07:37) (18 - 19)  SpO2: 96% (16 Jan 2018 07:37) (95% - 96%)    CAPILLARY BLOOD GLUCOSE        I&O's Summary      PHYSICAL EXAM:  GENERAL: Looks stated age, NAD  CARDIOVASCULAR: Normal S1, S2  PULMONARY: Lungs clear to auscultation bilaterally. No wheezes/rales/rhonchi  GI: Abdomen soft, Nontender, Nondistended. Bowel sounds present  MSK/Ext:  Left leg TTP.  PSYCH: Normal Affect.      LABS:                        12.6   9.1   )-----------( 239      ( 15 Abhishek 2018 05:15 )             37.4     01-15    142  |  106  |  22  ----------------------------<  105<H>  4.0   |  24  |  1.37<H>    Ca    9.6      15 Abhishek 2018 05:15    TPro  6.8  /  Alb  4.0  /  TBili  0.2  /  DBili  x   /  AST  20  /  ALT  14  /  AlkPhos  110  01-15              RADIOLOGY & ADDITIONAL TESTS:
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
## 2018-01-16 NOTE — DISCHARGE NOTE ADULT - MEDICATION SUMMARY - MEDICATIONS TO TAKE
I will START or STAY ON the medications listed below when I get home from the hospital:    oxyCODONE 5 mg oral tablet  -- 1 tab(s) by mouth every 6 hours, As Needed -for severe pain MDD:4   -- Caution federal law prohibits the transfer of this drug to any person other  than the person for whom it was prescribed.  It is very important that you take or use this exactly as directed.  Do not skip doses or discontinue unless directed by your doctor.  May cause drowsiness.  Alcohol may intensify this effect.  Use care when operating dangerous machinery.  This prescription cannot be refilled.  Using more of this medication than prescribed may cause serious breathing problems.    -- Indication: For Pain of lower extremity    enalapril 5 mg oral tablet  -- 1 tab(s) by mouth once a day  -- Indication: For Essential hypertension    aluminum hydroxide-magnesium hydroxide 200 mg-200 mg/5 mL oral suspension  -- 30 milliliter(s) by mouth every 6 hours, As needed, Dyspepsia  -- Indication: For Dyspepsia    topiramate 50 mg oral tablet  -- 1 tab(s) by mouth once a day (at bedtime)  -- Indication: For Headache    traZODone 300 mg oral tablet  -- 1 tab(s) by mouth once a day (at bedtime)  -- Indication: For Depression    citalopram 20 mg oral tablet  -- 1 tab(s) by mouth once a day  -- Indication: For Depression    simvastatin 40 mg oral tablet  -- 1 tab(s) by mouth once a day (at bedtime)  -- Indication: For Hyperlipidemia    Symbicort 160 mcg-4.5 mcg/inh inhalation aerosol  -- 1 puff(s) inhaled 2 times a day  -- Indication: For broncospasm    albuterol 2.5 mg/3 mL (0.083%) inhalation solution  -- 3 milliliter(s) inhaled every 4 hours  -- Indication: For broncospasm    lidocaine 5% topical film  -- Apply on skin to affected area once a day  -- Indication: For Pain of lower extremity    famotidine 20 mg oral tablet  -- 1 tab(s) by mouth 2 times a day  -- Indication: For Dyspepsia    Senna 8.6 mg oral tablet  -- 1 tab(s) by mouth once a day (at bedtime)  -- Indication: For constipation    lactulose 10 g/15 mL oral syrup  -- 30 milliliter(s) by mouth every 8 hours, As needed, Constipation  -- Indication: For constipation    Carafate 1 g/10 mL oral suspension  -- 10 milliliter(s) by mouth 4 times a day (before meals and at bedtime)  -- Indication: For Dyspepsia    fluticasone 27.5 mcg/inh nasal spray  -- 2 spray(s) per nostril once a day  -- Indication: For rhinitis    Melatonin 3 mg oral tablet  -- 1 tab(s) by mouth once (at bedtime)  -- Indication: For insomnia    thiamine 100 mg oral tablet  -- 1 tab(s) by mouth once a day  -- Indication: For supplement I will START or STAY ON the medications listed below when I get home from the hospital:    Actamin 325 mg oral tablet  -- 2 tab(s) by mouth every 6 hours, As Needed  -- Indication: For Leg pain    enalapril 5 mg oral tablet  -- 1 tab(s) by mouth once a day  -- Indication: For Essential hypertension    aluminum hydroxide-magnesium hydroxide 200 mg-200 mg/5 mL oral suspension  -- 30 milliliter(s) by mouth every 6 hours, As needed, Dyspepsia  -- Indication: For Dyspepsia    topiramate 50 mg oral tablet  -- 1 tab(s) by mouth once a day (at bedtime)  -- Indication: For Headache    traZODone 300 mg oral tablet  -- 1 tab(s) by mouth once a day (at bedtime)  -- Indication: For Depression    citalopram 20 mg oral tablet  -- 1 tab(s) by mouth once a day  -- Indication: For Depression    simvastatin 40 mg oral tablet  -- 1 tab(s) by mouth once a day (at bedtime)  -- Indication: For Hyperlipidemia    Symbicort 160 mcg-4.5 mcg/inh inhalation aerosol  -- 1 puff(s) inhaled 2 times a day  -- Indication: For broncospasm    albuterol 2.5 mg/3 mL (0.083%) inhalation solution  -- 3 milliliter(s) inhaled every 4 hours  -- Indication: For broncospasm    lidocaine 5% topical film  -- Apply on skin to affected area once a day  -- Indication: For Pain of lower extremity    Lasix 20 mg oral tablet  -- 1 tab(s) by mouth 3 times a week, [facility]  -- Indication: For Essential hypertension    famotidine 20 mg oral tablet  -- 1 tab(s) by mouth 2 times a day  -- Indication: For Dyspepsia    Senna 8.6 mg oral tablet  -- 1 tab(s) by mouth once a day (at bedtime)  -- Indication: For constipation    lactulose 10 g/15 mL oral syrup  -- 30 milliliter(s) by mouth every 8 hours, As needed, Constipation  -- Indication: For constipation    Carafate 1 g/10 mL oral suspension  -- 10 milliliter(s) by mouth 4 times a day (before meals and at bedtime)  -- Indication: For Dyspepsia    fluticasone 27.5 mcg/inh nasal spray  -- 2 spray(s) per nostril once a day  -- Indication: For rhinitis    Melatonin 3 mg oral tablet  -- 1 tab(s) by mouth once (at bedtime)  -- Indication: For insomnia    thiamine 100 mg oral tablet  -- 1 tab(s) by mouth once a day  -- Indication: For supplement

## 2018-01-16 NOTE — PHYSICAL THERAPY INITIAL EVALUATION ADULT - ADDITIONAL COMMENTS
Pt is a resident at a SNF and required assist with all ADLs. Pt amb with a RW 100ft, pt primarily uses a w/c.

## 2018-02-07 ENCOUNTER — EMERGENCY (EMERGENCY)
Facility: HOSPITAL | Age: 63
LOS: 1 days | Discharge: HOME | End: 2018-02-07
Attending: EMERGENCY MEDICINE | Admitting: EMERGENCY MEDICINE
Payer: MEDICAID

## 2018-02-07 VITALS
HEART RATE: 67 BPM | TEMPERATURE: 98 F | OXYGEN SATURATION: 97 % | SYSTOLIC BLOOD PRESSURE: 113 MMHG | DIASTOLIC BLOOD PRESSURE: 66 MMHG | RESPIRATION RATE: 15 BRPM

## 2018-02-07 VITALS
TEMPERATURE: 98 F | OXYGEN SATURATION: 97 % | DIASTOLIC BLOOD PRESSURE: 90 MMHG | RESPIRATION RATE: 14 BRPM | SYSTOLIC BLOOD PRESSURE: 140 MMHG | HEART RATE: 66 BPM

## 2018-02-07 DIAGNOSIS — Z96.659 PRESENCE OF UNSPECIFIED ARTIFICIAL KNEE JOINT: Chronic | ICD-10-CM

## 2018-02-07 PROCEDURE — 73562 X-RAY EXAM OF KNEE 3: CPT | Mod: 26,RT

## 2018-02-07 PROCEDURE — 99283 EMERGENCY DEPT VISIT LOW MDM: CPT | Mod: 25

## 2018-02-07 RX ORDER — ACETAMINOPHEN 500 MG
650 TABLET ORAL ONCE
Qty: 0 | Refills: 0 | Status: COMPLETED | OUTPATIENT
Start: 2018-02-07 | End: 2018-02-07

## 2018-02-07 RX ADMIN — Medication 650 MILLIGRAM(S): at 04:24

## 2018-02-07 NOTE — ED ADULT TRIAGE NOTE - CHIEF COMPLAINT QUOTE
Pt brought in by EMS from [facility] for R lower extremity pain.  Pt states was standing and felt leg give way.  R knee appears slightly swollen.  +pedal pulse.  Denies any other physical complaints.  PMHx: GERD, depression, cellulitis, COPD, anxiety, HTN Pt brought in by EMS from [facility] for R lower extremity pain.  Pt states was standing and felt leg give way.  R knee appears swollen.  +pedal pulse.  Denies any other physical complaints.  PMHx: GERD, depression, cellulitis, COPD, anxiety, HTN
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
## 2018-02-07 NOTE — ED ADULT NURSE NOTE - CHIEF COMPLAINT QUOTE
Pt brought in by EMS from [facility] for R lower extremity pain.  Pt states was standing and felt leg give way.  R knee appears swollen.  +pedal pulse.  Denies any other physical complaints.  PMHx: GERD, depression, cellulitis, COPD, anxiety, HTN

## 2018-02-07 NOTE — ED ADULT NURSE NOTE - OBJECTIVE STATEMENT
rec'd pt. asleep but easily arousable, oriented x3, with hx of GERD, COPD, HTN, anxiety/depression and cellulitis, pt. sent from [facility] for rt leg pain. as per pt, she's been having pain on her rt "knee cap" x 1 week. pt. states she uses a walker, went to the bathroom and tripped a week ago. noted swelling and redness on b/l legs, warm to touch., (+) pedal pulses. denies any fever at home. pt. states pain is worse on her rt leg and was given Tramadol in the nsg with minimal relief. noted blanchable redness on buttocks, diaper is dry at this time. pt. seen by MD. awaits xr. will continue to monitor. endorsed to Primary RN Parvez

## 2018-02-07 NOTE — ED PROVIDER NOTE - PROGRESS NOTE DETAILS
July: pain improved. XR negative for fx. Will send pt back to [facility] on ambulette. Klepfish: Awaiting ambulette.

## 2018-02-07 NOTE — ED PROVIDER NOTE - ATTENDING CONTRIBUTION TO CARE
62[facility], [facility], HTN p/w R knee pain for at least 1w, no other systemic symptoms. Vitals wnl, exam as above.  ddx: Clinically not infectious. Likely OA vs. muscle strain vs less likely fx.   Tylenol, XR, reassess.

## 2018-02-07 NOTE — ED PROVIDER NOTE - PHYSICAL EXAMINATION
1+ b/l LE pitting edema. slight erythema b/l LE.   scar R knee. no joint warmth or ttp. Exam lmtd by obesity. Slightly dec ROM of b/l knees 2/2 pain. normal equal distal pulses.  strength b/l LE 3/5.

## 2018-04-08 ENCOUNTER — EMERGENCY (EMERGENCY)
Facility: HOSPITAL | Age: 63
LOS: 1 days | Discharge: HOME | End: 2018-04-08
Attending: EMERGENCY MEDICINE
Payer: MEDICAID

## 2018-04-08 VITALS
HEART RATE: 77 BPM | RESPIRATION RATE: 18 BRPM | SYSTOLIC BLOOD PRESSURE: 188 MMHG | OXYGEN SATURATION: 98 % | DIASTOLIC BLOOD PRESSURE: 97 MMHG

## 2018-04-08 DIAGNOSIS — Z96.659 PRESENCE OF UNSPECIFIED ARTIFICIAL KNEE JOINT: Chronic | ICD-10-CM

## 2018-04-08 PROCEDURE — 93010 ELECTROCARDIOGRAM REPORT: CPT

## 2018-04-08 PROCEDURE — 99284 EMERGENCY DEPT VISIT MOD MDM: CPT | Mod: 25

## 2018-04-08 NOTE — ED PROVIDER NOTE - PROGRESS NOTE DETAILS
Jose David BOYD: NH called and RN supervisor reached. Patient story has been corrobotated. RN supervisor reports patient is a well known patient in different ERs. She usually requests to be taken to the hospital Jose David BOYD: Patient reassessed and reports feeling better. Patient ready for D/C. She understands the CT head results.

## 2018-04-08 NOTE — ED PROVIDER NOTE - ATTENDING CONTRIBUTION TO CARE
Attending MD Main:  I personally have seen and examined this patient.  Resident note reviewed and agree on plan of care and except where noted.  See MDM for details.

## 2018-04-08 NOTE — ED PROVIDER NOTE - OBJECTIVE STATEMENT
Jose David BOYD: 61 y/o female with hx of GERD, MDD, COPD, Non ambulatory (uses Wheelchair) here from [facility] after a fall. Patient reports she fell in the bathroom while trying to transfer unaided. States she hit the back of her head and passed out and recovered consciouness and was found by the PCA. Patient also states she is scheduled for surgery of her R knee in one week. Denies fever, chills, SOB, CP, palpitations, neck pain, abd pain, N/V/D, urinary symptoms, back pain. Patient does not know how long she was unconscious. Exam shows a woman in NAD holding a Mario bear with abd soft and nontender and no midline cervical tenderness and full ROM of her neck. EOMI, PERRL, clear lungs and cardiac S1S2 noted, no murmus. LE with strength 0/5. NO rashes, erythema, lacerations or signs of trauma. Consider mechanical fall and eval for ICH. Plan CT head and EKG and reassess.

## 2018-04-08 NOTE — ED PROVIDER NOTE - CARE PLAN
Principal Discharge DX:	Head injury  Assessment and plan of treatment:	You were seen in the Emergency Department for head injury after a fall. Your examination and CT were reassuring. Please follow up with your regular physician this week for reevaluation. Take tylenol as needed for pain. Please return to the Emergency Department if you have any new concerning symptoms such as severe pain, weakness, or any other concerning symptoms.

## 2018-04-08 NOTE — ED PROVIDER NOTE - PLAN OF CARE
You were seen in the Emergency Department for head injury after a fall. Your examination and CT were reassuring. Please follow up with your regular physician this week for reevaluation. Take tylenol as needed for pain. Please return to the Emergency Department if you have any new concerning symptoms such as severe pain, weakness, or any other concerning symptoms.

## 2018-04-08 NOTE — ED PROVIDER NOTE - MEDICAL DECISION MAKING DETAILS
Jose David BOYD: 61 y/o female with hx of GERD, MDD, COPD, Non ambulatory (uses Wheelchair) here from [facility] after a fall. Patient reports she fell in the bathroom while trying to transfer unaided. States she hit the back of her head and passed out and recovered consciouness and was found by the PCA. Patient also states she is scheduled for surgery of her R knee in one week. Denies fever, chills, SOB, CP, palpitations, neck pain, abd pain, N/V/D, urinary symptoms, back pain. Patient does not know how long she was unconscious. Exam shows a woman in NAD holding a Mario bear with abd soft and nontender and no midline cervical tenderness and full ROM of her neck. EOMI, PERRL, clear lungs and cardiac S1S2 noted, no murmus. LE with strength 0/5. NO rashes, erythema, lacerations or signs of trauma. Consider mechanical fall and eval for ICH. Plan CT head and EKG and reassess.

## 2018-04-09 VITALS
HEART RATE: 75 BPM | DIASTOLIC BLOOD PRESSURE: 77 MMHG | SYSTOLIC BLOOD PRESSURE: 124 MMHG | OXYGEN SATURATION: 95 % | TEMPERATURE: 98 F | RESPIRATION RATE: 18 BRPM

## 2018-04-09 PROCEDURE — 93005 ELECTROCARDIOGRAM TRACING: CPT

## 2018-04-09 PROCEDURE — 99284 EMERGENCY DEPT VISIT MOD MDM: CPT

## 2018-04-09 PROCEDURE — 70450 CT HEAD/BRAIN W/O DYE: CPT

## 2018-04-09 PROCEDURE — 70450 CT HEAD/BRAIN W/O DYE: CPT | Mod: 26

## 2018-04-09 RX ORDER — ACETAMINOPHEN 500 MG
975 TABLET ORAL ONCE
Qty: 0 | Refills: 0 | Status: COMPLETED | OUTPATIENT
Start: 2018-04-09 | End: 2018-04-09

## 2018-04-09 RX ADMIN — Medication 975 MILLIGRAM(S): at 00:17
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
## 2018-05-09 NOTE — ED PROVIDER NOTE - GASTROINTESTINAL, MLM
Abdomen soft, non-tender, no guarding. Complex Repair And Ftsg Text: The defect edges were debeveled with a #15 scalpel blade.  The primary defect was closed partially with a complex linear closure.  Given the location of the defect, shape of the defect and the proximity to free margins a full thickness skin graft was deemed most appropriate to repair the remaining defect.  The graft was trimmed to fit the size of the remaining defect.  The graft was then placed in the primary defect, oriented appropriately, and sutured into place.

## 2018-05-10 ENCOUNTER — EMERGENCY (EMERGENCY)
Facility: HOSPITAL | Age: 63
LOS: 1 days | Discharge: HOME | End: 2018-05-10
Attending: EMERGENCY MEDICINE
Payer: MEDICAID

## 2018-05-10 VITALS
TEMPERATURE: 98 F | SYSTOLIC BLOOD PRESSURE: 116 MMHG | OXYGEN SATURATION: 97 % | HEART RATE: 80 BPM | RESPIRATION RATE: 18 BRPM | DIASTOLIC BLOOD PRESSURE: 74 MMHG

## 2018-05-10 DIAGNOSIS — Z96.659 PRESENCE OF UNSPECIFIED ARTIFICIAL KNEE JOINT: Chronic | ICD-10-CM

## 2018-05-10 PROCEDURE — 99284 EMERGENCY DEPT VISIT MOD MDM: CPT

## 2018-05-10 PROCEDURE — 93971 EXTREMITY STUDY: CPT

## 2018-05-10 PROCEDURE — 99284 EMERGENCY DEPT VISIT MOD MDM: CPT | Mod: 25
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
## 2018-05-10 NOTE — ED PROVIDER NOTE - CONSTITUTIONAL, MLM
normal... Well appearing, well nourished, awake, alert, oriented to person, place (hospital but doesn't know name), time/situation and in no apparent distress.

## 2018-05-10 NOTE — ED PROVIDER NOTE - SKIN COLOR
Light erythema RLE up to mid-thigh, with sutures in place for R knee replacement. No discharge from surgical site.
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
## 2018-05-11 VITALS
OXYGEN SATURATION: 97 % | HEART RATE: 82 BPM | DIASTOLIC BLOOD PRESSURE: 74 MMHG | RESPIRATION RATE: 18 BRPM | SYSTOLIC BLOOD PRESSURE: 120 MMHG

## 2018-05-11 PROCEDURE — 93971 EXTREMITY STUDY: CPT | Mod: 26

## 2018-05-11 NOTE — ED ADULT NURSE REASSESSMENT NOTE - NS ED NURSE REASSESS COMMENT FT1
U/S results are back. MD aware. MD will speak to the patient. Awaiting ambulette as well. Pt is sleeping, easy work of breathing. NAD. Call bell at reach. Will continue to monitor.

## 2019-11-07 NOTE — DISCHARGE NOTE ADULT - NS MD DC PLAN IMMU FLU PROVIDE INFO
[Follow-Up] : a follow-up visit [FreeTextEntry1] : GERD, allergies, asthma, and SOB Risks/benefits discussed with patient or patient surrogate

## 2021-05-27 NOTE — ED ADULT NURSE NOTE - CAS DISCH BELONGINGS RETURNED
Patient called stating that she has been getting confused and has been feeling like she is going to lose control of her legs. She denies chest pain. She states that this has been happening for a while, but had not happened for a few weeks until yesterday.     She states that her blood pressure has been normal for her, states that it has been on the low side, but has not check in a while.    Patient was made aware to call PCP office to schedule appointment. Patient was made aware that if she were to pass out or fall to go to ER to be evaluated.        Yes

## 2021-10-13 NOTE — ED PROVIDER NOTE - CROS ED RESP ALL NEG
"Chief Complaint   Patient presents with     Nose Problem       Initial BP 98/60 (BP Location: Left arm, Patient Position: Chair, Cuff Size: Adult Regular)   Pulse 105   Temp 98.6  F (37  C) (Tympanic)   Resp 18   Wt 72.6 kg (160 lb)   SpO2 97%   BMI 25.06 kg/m   Estimated body mass index is 25.06 kg/m  as calculated from the following:    Height as of 1/19/21: 1.702 m (5' 7\").    Weight as of this encounter: 72.6 kg (160 lb).  Medication Reconciliation: complete  Nika Ball LPN  " - - -

## 2022-04-29 NOTE — PATIENT PROFILE ADULT. - FALLEN IN THE PAST
Bleeding that does not stop/Swelling that gets worse/Pain not relieved by Medications/Fever greater than (need to indicate Fahrenheit or Celsius)/Wound/Surgical Site with redness, or foul smelling discharge or pus no

## 2022-11-28 NOTE — ED PROVIDER NOTE - PSYCHIATRIC RISK
PAST SURGICAL HISTORY:  No significant past surgical history     
no HI or SI/no verbalization of thoughts of harm

## 2022-12-16 NOTE — ED ADULT NURSE NOTE - CHIEF COMPLAINT QUOTE
Patient c/o chest pain and pressure starting 11pm. Patient was given 162 ASA and 1NTG SL with no relief. Patient reports hx. COPD, Hyperlipidemia. Patient received with 22G IV Right hand by EMS. Never

## 2023-04-05 NOTE — PATIENT PROFILE ADULT. - PRO ANTICIPATED DISCH DISP
extended care facility Peng Advancement Flap Text: The defect edges were debeveled with a #15 scalpel blade. Given the location of the defect, shape of the defect and the proximity to free margins a Peng advancement flap was deemed most appropriate. Using a sterile surgical marker, an appropriate advancement flap was drawn incorporating the defect and placing the expected incisions within the relaxed skin tension lines where possible. The area thus outlined was incised deep to adipose tissue with a #15 scalpel blade. The skin margins were undermined to an appropriate distance in all directions utilizing iris scissors. Following this, the designed flap was advanced and carried over into the primary defect and sutured into place.

## 2023-05-15 NOTE — ED PROVIDER NOTE - CARDIAC, MLM
[facility]  Occupational Therapy Treatment Note     Date: 5/15/2023  Name: Phong Fofana  Clinic Number: [id number]    Therapy Diagnosis:   Encounter Diagnoses   Name Primary?    Decreased range of motion Yes    Decreased coordination     Decreased strength     Contracture of hand      Physician: Tobi Levin,     Physician Orders: OT eval and treat   Medical Diagnosis:   Z98.890 (ICD-10-CM) - History of cervical spinal surgery  Evaluation Date: 2/28/2023  Progress Notes Due: 3/31/23, 4/28/23  Plan of Care Expiration Period: 5/26/2023   Insurance Authorization period Expiration: 12/31/2023  Date of Return to MD: 3/20/2023 / Dr. Levin neurosurgery   Visit # / Visits Authorized: 14 / 40 (+eval)  FOTO: 50/100 on intake   56/100 on 4/5/2023    Time In: 0930  Time Out: 1020    Total Billable (one on one) Time: 50 minutes     Precautions: Standard, Fall, Seizure, and Post-op cervical fusion  Post-op week 3; Per patient's dad, MD orders are as follows: wear brace to sleep and to eat, try to take brace off when he's sitting up, maybe off a little when he's walking.  Per [facility] Protocol weeks 0-8:   1. Prevent excessive initial mobility or stress on tissues  2. Limit overhead arm movements, bending and lifting.  3. Please follow physician recommendations regarding use of collars etc. (multilevel fusions hard  collar for 6 wks; one-level fusions wear a collar as needed for a week or two)  No prone, no bridging, no lying with a pillow, no active neck movement for a few weeks.     SUBJECTIVE     Pt reports: Garo reports he has 14 days until his ortho appointment for his hands. He is open to use of an orthosis on his right hand to address the resting position of his thumb.     He was compliant with home exercise program given last session .  Response to previous treatment: no concerns   Functional change:  no significant change compared to previous session per pt report  "    Pain & Location: 6/10 neck; 4 /10 digits on the left and right hands "same as always"    OBJECTIVE     Objective Measures updated at progress report unless specified.    Treatment     Garo received the treatments listed below:     Therapeutic activities to improve functional performance for 40 minutes, including:    Gentle mobilization of joints involving bilateral UE phalanges, metacarpals, carpals, radius and ulna to facilitate increases in passive movement. Gentle stretching of the soft tissues of bilateral wrist and hand to decrease edema and improve PROM, potentially allowing for increases in   active movement.     OT used pool noodle and coban for positioning of the left hand to stretch digits towards extension while in therapy.     OT fabricated custom hand-based orthosis for the right hand specifically to address thumb IP resting position. Patient was able to wear it throughout physical therapy and no redness/ areas of concern were noted when OT doffed orthosis to check after 1 hour.     Left wrist extension AROM x10, 1# x10, 2.5# 3x10 - wrist weight wrapped around hand.    Right wrist extension 3x10 with 4#    Bilateral shoulder press with same weights as noted above and while holding a therapy ball and walking. He was cued to keep his head up to minimize any discomfort to his neck. 3x15.    Alternating forward punch with the same weights as noted above, 4x15.     Planks on forearms for core strengthening x10s, x15s & x2 min.     Patient Education and Home Exercises      Education provided:   - ed patient & step-dad re: orthotic wear/ care. Dad verbalized understanding of donning orthoses.   - will need help from family for blocking exercises.   - Progress towards goals     Written Home Exercises Provided: yes. Written ed re: orthosis  Exercises were reviewed and Garo was able to demonstrate them prior to the end of the session. Garo demonstrated good  understanding of the HEP provided. See EMR under " Patient Instructions for exercises provided during therapy sessions.       Assessment     Pt would continue to benefit from skilled OT. OT fabricated orthosis for patient's right hand to address his thumb. His fingers on the right have relatively good functional movement, but the thumb still rests in ~90* IP flexion. Orthosis should keep thumb out of end range flexion, allowing for increased stretch towards extension. Will need to continue addressing range of motion exercises and low-load prolonged stretch (LLPS) for maximum potential functional return. OT will also continue addressing functional participation with modifications as necessary given limitations in hand movements.      Garo is progressing well towards his goals and there are no updates to goals at this time. Pt prognosis is Good.     Pt will continue to benefit from skilled outpatient occupational therapy to address the deficits listed in the problem list on initial evaluation provide pt/family education and to maximize pt's level of independence in the home and community environment.     Pt's spiritual, cultural and educational needs considered and pt agreeable to plan of care and goals.    Anticipated barriers to occupational therapy:  communication; spinal precautions     Goals:  Short Term Goals: 6 weeks   1) Pt will be (I) with initial HEP (progressing 5/15/2023)   2) Pt will be (I) with manual stretching program to increase functional use of bilateral hands (progressing 5/15/2023)   3) Pt will gain 4 lbs of  strength bilaterally for increased participation during functional tasks (progressing 5/15/2023)   4) Pt will be able to open a medication bottle with Mod I 5/5 attempts (progressing 5/15/2023)   5) Cervical range of motion to be assessed once precautions lifted and goal(s) to be added as necessary (progressing 5/15/2023)   6) Pt will complete 10 minutes on the ergometer, standing, level 3.0 for increased standing/activity tolerance  (once precautions are lifted)  (progressing 5/15/2023)   7). Pt will wash and dry 10 cups/dishes with mod I.  (progressing 5/15/2023)      Long Term Goals: 12 weeks   1) Mod I - cutting food (progressing 5/15/2023)   2) Mod I - buttons and fasteners (progressing 5/15/2023)   3) Pt will be able to tie his shoes, Mod I, in a timely manner. (progressing 5/15/2023)   4) Pt will increase fine motor coordination, as evidenced by a decrease of 12 seconds bilaterally during 9 Hole Peg test (progressing 5/15/2023)   5) Pt will increase gross motor coordination, as evidenced by an increase of 10 blocks bilaterally during Box and Block. (progressing 5/15/2023)   6) Pt will self report increased ability to turn steering wheel on riding  to successfully cut the grass in one location at his residence (front or back yard). (progressing 5/15/2023)      Additional functional goals to be added as pt progresses and per his priorities.     PLAN     Certification Period/Plan of care expiration: 2/28/2023 to 5/26/2023.     Outpatient Occupational Therapy 2 times weekly for 12 weeks to include the following interventions: Manual Therapy, Neuromuscular Re-ed, Orthotic Management and Training, Patient Education, Self Care, Therapeutic Activities, and Therapeutic Exercise.     Updates/Grading for next session: orthosis fit check     Shagufta Rucker OT           Normal rate, regular rhythm.  Heart sounds S1, S2.

## 2023-11-14 NOTE — ED PROVIDER NOTE - NS ED MD DISPO ADMIT LIJ UNIT
TELE Muscle Hinge Flap Text: The defect edges were debeveled with a #15 scalpel blade.  Given the size, depth and location of the defect and the proximity to free margins a muscle hinge flap was deemed most appropriate. Using a sterile surgical marker, an appropriate hinge flap was drawn incorporating the defect. The area thus outlined was incised with a #15 scalpel blade. The skin margins were undermined to an appropriate distance in all directions utilizing iris scissors. Following this, the designed flap was carried into the primary defect and sutured into place.

## 2023-11-19 NOTE — ED ADULT TRIAGE NOTE - NS ED NOTE AC HIGH RISK COUNTRIES
Called and left voicemail requesting return call. Called and spoke with patient's mother and discussed x-ray results at length answered all questions. Explained evidence of returning for a splint due to x-ray read. She declines a splint at this time. He is currently in a walking boot and crutches. States he is overall feeling better-pain and swelling has decreased. States she will keep him in the walking boot and crutches/nonweightbearing and will call orthopedics tomorrow for a follow-up appointment or go to the ER sooner if needed. No

## 2024-02-29 NOTE — ED SUB INTERN NOTE - CARDIAC HEART SOUNDS
PROGRESS NOTE    ADMISSION DATE:  2/26/2024  DATE:  2/29/2024  CURRENT HOSPITAL DAY:  Hospital Day: 4  ATTENDING PHYSICIAN:  Henok Caraballo MD  CODE STATUS:  Full Resuscitation    CHIEF COMPLAINT:  weakness, fall    SUBJECTIVE: After longer discussion with patient and her son, she finally agreed that she would not be safe at home. She's seen up in chair nodding off. She's stating that she'd rather go home today but reiterated reason why that's not safe.  Staff note she has been coughing with intake.  This was not mentioned before.    ACTIVE PROBLEMS:    Active Hospital Problems    Diagnosis     Weakness          CURRENT MEDICATIONS:    Current Facility-Administered Medications   Medication    aspirin (ECOTRIN) enteric coated tablet 81 mg    levothyroxine (SYNTHROID, LEVOTHROID) tablet 88 mcg    magnesium oxide (MAG-OX) tablet 400 mg    metoPROLOL tartrate (LOPRESSOR) tablet 25 mg    atorvastatin (LIPITOR) tablet 10 mg    triamterene-hydroCHLOROthiazide (MAXZIDE-25) 37.5-25 MG per tablet 1 tablet    Potassium Standard Replacement Protocol (Levels 3.5 and lower)    Magnesium Standard Replacement Protocol    Phosphorus Standard Replacement Protocol    ondansetron (ZOFRAN) injection 4 mg    acetaminophen (TYLENOL) tablet 650 mg    polyethylene glycol (MIRALAX) packet 17 g    docusate sodium-sennosides (SENOKOT S) 50-8.6 MG 2 tablet    bisacodyl (DULCOLAX) suppository 10 mg    sodium chloride 0.9 % flush bag 25 mL    enoxaparin (LOVENOX) injection 40 mg    cyclobenzaprine (FLEXERIL) tablet 5 mg    ketorolac (TORADOL) injection 15 mg         HISTORIES:     Past Medical History:   Diagnosis Date    Abnormal mammogram 06/30/2014    Allergy     Arthritis     Back pain, chronic 10/22/2014    Bilateral hip pain 02/03/2020    Breast cancer screening 08/16/2016    Cataract     Chronic kidney disease     Chronic kidney disease (CKD) 06/30/2014    Chronic left shoulder pain 12/26/2016    Elbow fracture, left     Elevated  bilirubin 06/24/2019    Fall 02/26/2024    Fibula fracture 02/26/2024    Nondisplaced distal fibular fracture without definite evidence of deltoid injury or medial malleolus fracture.    Forehead laceration 02/26/2024    Left forehead, s/p fall.    H/O ankle x-ray 02/26/2024    Nondisplaced distal fibular fracture without definite evidence of deltoid injury or medial malleolus fracture.    Hypercalcemia 06/30/2014    Hyperlipidemia 06/30/2014    Hyperparathyroidism, unspecified (CMD) 12/02/2019    Hypertension 06/30/2014    Hypothyroidism 06/30/2014    Medicare annual wellness visit, subsequent 02/03/2020    Neuropathy     Obesity 06/30/2014    Osteoarthritis 06/30/2014    Osteoarthritis, hip, bilateral 06/08/2016    Osteoporosis screening 06/24/2019    Other atherosclerosis of native arteries of extremities, bilateral legs (CMD) 12/02/2019    Thyroid disease     Varicose veins of legs 09/20/2017    Weakness 02/26/2024     Past Surgical History:   Procedure Laterality Date    Elbow surgery Left     Fracture surgery      Salivary gland surgery        reports that she quit smoking about 34 years ago. Her smoking use included cigarettes. She started smoking about 54 years ago. She has a 40.0 pack-year smoking history. She has never used smokeless tobacco. She reports that she does not currently use alcohol. She reports that she does not use drugs.     OBJECTIVE:    VITAL SIGNS:     Vital Last Value 24 Hour Range   Temperature 97.3 °F (36.3 °C) (02/29/24 0445) Temp  Min: 97.3 °F (36.3 °C)  Max: 98.2 °F (36.8 °C)   Pulse (!) 100 (02/29/24 0445) Pulse  Min: 58  Max: 100   Respiratory 16 (02/29/24 0445) Resp  Min: 16  Max: 18   Non-Invasive  Blood Pressure 120/81 (02/29/24 0445) BP  Min: 96/59  Max: 130/84   Pulse Oximetry 94 % (02/29/24 0445) SpO2  Min: 90 %  Max: 94 %     Vital Today Admitted   Weight 91.8 kg (202 lb 6.1 oz) (02/26/24 2103) Weight: 90.1 kg (198 lb 10.2 oz) (02/26/24 1658)   Height N/A Height: 5' 10\"  (177.8 cm) (02/26/24 2103)   Body Mass Index N/A BMI (Calculated): 29.04 (02/26/24 2103)      LABS:  Recent Labs   Lab 02/28/24  0558 02/27/24  1709 02/27/24  0632 02/26/24 2017   WBC 12.4*  --  12.4* 12.8*   HCT 33.4*  --  33.6* 35.7*   HGB 11.3*  --  11.5* 12.0     --  404 405   INR  --   --   --  1.1   PTT  --   --   --  28   SODIUM 133*  --  131* 131*   POTASSIUM 3.8 3.8 3.2* 3.1*   CHLORIDE 97  --  94* 92*   CO2 26  --  22 28   GLUCOSE 93  --  107* 97   BUN 15  --  16 16   CREATININE 0.93  --  0.88 0.97*           IMAGING:  XR HIP 2 VIEWS LEFT   Final Result   No fracture. Chronic severe left hip arthritis.                  Electronically Signed by: MARRY MENDOZA MD    Signed on: 2/26/2024 6:30 PM    Workstation ID: [id number]      XR KNEE 3 VIEW LEFT   Final Result   No fracture.                  Electronically Signed by: MARRY MENDOZA MD    Signed on: 2/26/2024 6:26 PM    Workstation ID: [id number]      XR ANKLE MIN 3 VIEWS LEFT   Final Result   Nondisplaced distal fibular fracture without definite evidence of deltoid   injury or medial malleolus fracture.                  Electronically Signed by: MARRY MENDOZA MD    Signed on: 2/26/2024 6:30 PM    Workstation ID: [id number]      CT HEAD WO CONTRAST   Final Result      No CT evidence for an acute intracranial process.      Electronically Signed by: BLANCA PARSONS M.D.    Signed on: 2/26/2024 5:50 PM    Workstation ID: [id number]      CT CERVICAL SPINE WO CONTRAST   Final Result   1. No evidence for an acute cervical spine fracture.      Electronically Signed by: BLANCA PARSONS M.D.    Signed on: 2/26/2024 5:57 PM    Workstation ID: [id number]       No results found for this or any previous visit (from the past 4464 hour(s)).      LINES:  Available Intravenous Access       PICC Line / CVC Line / PIV Line / Intraosseous Line / Line / UAC Line  Duration             Peripheral IV 02/26/24 Right Forearm 20 2 days                     I/Os:    Intake/Output         02/28 0700  02/29 0659 02/29 0700  03/01 0659    P.O. 720     IV Piggyback      Total Intake 720     Urine 100     Total Output 100     Net +620           Urine Occurrence 2 x     Stool Occurrence 1 x              DIET:   Dietary Orders (From admission, onward)       Start     Ordered    02/27/24 1302  2 Times/Day w Breakfast & Dinner; Ensure Plus HP/Standard Oral Supplement, Chocolate Oral Nutrition Supplement  As Directed        Question Answer Comment   Frequency 2 Times/Day w Breakfast & Dinner    Oral Supplement Ensure Plus HP/Standard Oral Supplement, Chocolate        02/27/24 1302    02/26/24 2227  Regular Diet  DIET EFFECTIVE NOW        Question:  Diet Modifiers  Answer:  Regular    02/26/24 2228                          INTAKE/OUTPUT:      Intake/Output Summary (Last 24 hours) at 2/29/2024 0809  Last data filed at 2/28/2024 2300  Gross per 24 hour   Intake 480 ml   Output 100 ml   Net 380 ml           PHYSICAL EXAM:    Physical Exam  Constitutional:       General: She is not in acute distress.     Appearance: Normal appearance. She is well-developed. She is obese.   HENT:      Head: Normocephalic and atraumatic.   Neck:      Thyroid: No thyroid mass or thyromegaly.      Vascular: No carotid bruit or JVD.      Trachea: Trachea normal. No tracheal tenderness.   Cardiovascular:      Rate and Rhythm: Normal rate and regular rhythm.      Heart sounds: S1 normal and S2 normal.   Pulmonary:      Effort: Pulmonary effort is normal. No accessory muscle usage or respiratory distress.   Abdominal:      General: Bowel sounds are normal. There is no distension.      Palpations: Abdomen is soft. There is no fluid wave or mass.      Tenderness: There is no abdominal tenderness.   Musculoskeletal:         General: Normal range of motion.      Cervical back: Full passive range of motion without pain, normal range of motion and neck supple.      Comments: L ankle ace wrap   Skin:     General: Skin is  warm and dry.   Neurological:      Mental Status: She is alert and oriented to person, place, and time.      Cranial Nerves: No cranial nerve deficit.      Sensory: No sensory deficit.   Psychiatric:         Speech: Speech normal.         Behavior: Behavior normal. Behavior is cooperative.         Thought Content: Thought content normal.         Judgment: Judgment normal.          REVIEW OF SYSTEMS:  Review of Systems   Constitutional: Negative.  Negative for chills, fatigue and fever.   HENT: Negative.  Negative for congestion.    Eyes: Negative.    Respiratory: Negative.     Cardiovascular: Negative.    Gastrointestinal: Negative.  Negative for abdominal distention, constipation, diarrhea and nausea.   Endocrine: Negative.    Genitourinary: Negative.  Negative for difficulty urinating, dysuria and urgency.   Musculoskeletal:  Positive for arthralgias and gait problem. Negative for back pain, joint swelling, myalgias and neck pain.   Skin: Negative.  Negative for rash and wound.   Allergic/Immunologic: Negative.  Negative for immunocompromised state.   Neurological:  Positive for weakness. Negative for dizziness, syncope, facial asymmetry and headaches.   Hematological: Negative.    Psychiatric/Behavioral: Negative.  Negative for agitation, behavioral problems, confusion and sleep disturbance.    All other systems reviewed and are negative.       LABORATORY DATA:    No results found for this or any previous visit (from the past 24 hour(s)).                                     ASSESSMENT/PLAN:     ANTIBIOTICS/DRIPS/IV STEROIDS: None  CONSULTANTS: Podiatry    78-year-old female with past medical history of CKD, hyperparathyroidism, obesity, hypertension, hyper lipidemia, hypothyroidism who presented after a fall.    Mechanical fall  -Head CT, CT C-spine both unremarkable  - PT/OT    Left occipital scalp laceration  -Stapled    Left distal fibular fracture  (Podiatry)  -Weightbearing as tolerated with fracture  boot  -Nonoperative for right now    Hyperventilation  - Notes her PCP would like her to have a CT of her chest  - She is adamant this has nothing to do with her anxiety  -She wants nothing to do with any testing as to whether or not she needs oxygen at home.    Coughing with intake  Rule out aspiration  - SLP eval with recommendation for video swallow  -Chest x-ray pending    NSVT, resolved  - Continue to monitor    Hyponatremia  - Trend    Hypokalemia  Hypomagnesemia  - Supplement and trend    Leukocytosis  - Presumed reactive    Unspecified anemia  - Trend    Obesity    Hypertension  Hyperlipidemia  Hypothyroidism  Anxiety  - Continue home meds    Chronic kidney disease stage III  - Appears to be at baseline    Nutrition status: Malnutrition diagnosis acute illness/injury; non-severe (moderate): Energy intake of <75% of estimated energy requirement for >7 days, Weight loss of 7.5%/3 months. Intervention:Coordination of Nutrition care by a nutritional Professional, Nutritional Supplemental therapy. See RD Note for Current recommendations.       CODE STATUS: full   DVT prophylaxis: Lovenox    Chronic Health Problems: Con't all current meds for all other stable chronic conditions.      DISPOSITION/NEXT STEPS: Plan is for SNF.  Patient really does not want to wear the boot.  She understands she will need to participate in rehab for it to be covered by her insurance and that she is only allowed for weightbearing if she is wearing the boot.  VFSS pending.  Check chest x-ray               Podiatry has cleared patient for discharge with recommendation of follow-up in the office for repeat set of XR.    Case and care discussed in collaboration with Dr. Caraballo.    Adonis Garcia, MS, PA-C        The 21st Century Cures Act makes medical notes like these available to patients in the interest of transparency. However, be advised this is a medical document. It is intended as peer to peer communication. It is written in  medical language and may contain abbreviations, jargon, and other verbiage that could be misleading or confusing to lay persons. It may appear blunt or direct. Medical documents are intended to carry relevant information, facts as evident, and the clinical opinion of the physician.   This note used Dragon technology. Transcription errors are not uncommon and may not have been corrected prior to electronically signing the note. Should you find these errors, please consult the clinician for interpretation.       S1-S2

## 2024-05-31 NOTE — ED PROCEDURE NOTE - CPROC ED PHYSICIAN PRESENCE1
I was present during the key portion of the procedure. Attending Attestation (For Attendings USE Only)...

## 2024-06-15 PROBLEM — F32.9 MAJOR DEPRESSIVE DISORDER, SINGLE EPISODE, UNSPECIFIED: Chronic | Status: ACTIVE | Noted: 2017-09-08

## 2024-06-15 PROBLEM — K21.9 GASTRO-ESOPHAGEAL REFLUX DISEASE WITHOUT ESOPHAGITIS: Chronic | Status: ACTIVE | Noted: 2017-09-08

## 2024-06-15 PROBLEM — F41.9 ANXIETY DISORDER, UNSPECIFIED: Chronic | Status: ACTIVE | Noted: 2017-09-08

## 2024-06-15 PROBLEM — L03.119 CELLULITIS OF UNSPECIFIED PART OF LIMB: Chronic | Status: ACTIVE | Noted: 2017-10-05

## 2024-06-15 RX ORDER — SUCRALFATE 1 G
10 TABLET ORAL
Qty: 0 | Refills: 0 | DISCHARGE

## 2024-06-15 RX ORDER — THIAMINE MONONITRATE (VIT B1) 100 MG
1 TABLET ORAL
Qty: 0 | Refills: 0 | DISCHARGE

## 2024-06-15 RX ORDER — SENNA PLUS 8.6 MG/1
2 TABLET ORAL
Refills: 0 | DISCHARGE

## 2024-06-15 RX ORDER — FLUTICASONE PROPIONATE 50 MCG
2 SPRAY, SUSPENSION NASAL
Qty: 0 | Refills: 0 | DISCHARGE

## 2024-06-15 RX ORDER — BUDESONIDE AND FORMOTEROL FUMARATE DIHYDRATE 160; 4.5 UG/1; UG/1
1 AEROSOL RESPIRATORY (INHALATION)
Qty: 0 | Refills: 0 | DISCHARGE

## 2024-06-15 RX ORDER — TOPIRAMATE 25 MG
1 TABLET ORAL
Refills: 0 | DISCHARGE

## 2024-06-15 RX ORDER — SIMVASTATIN 20 MG/1
2 TABLET, FILM COATED ORAL
Refills: 0 | DISCHARGE

## 2024-06-15 RX ORDER — LANOLIN ALCOHOL/MO/W.PET/CERES
1 CREAM (GRAM) TOPICAL
Qty: 0 | Refills: 0 | DISCHARGE

## 2024-06-15 RX ORDER — TRAZODONE HCL 50 MG
1 TABLET ORAL
Qty: 0 | Refills: 0 | DISCHARGE

## 2024-06-15 RX ORDER — FAMOTIDINE 10 MG/ML
1 INJECTION INTRAVENOUS
Qty: 0 | Refills: 0 | DISCHARGE

## 2024-06-17 ENCOUNTER — RESULT REVIEW (OUTPATIENT)
Age: 69
End: 2024-06-17

## 2024-06-22 ENCOUNTER — TRANSCRIPTION ENCOUNTER (OUTPATIENT)
Age: 69
End: 2024-06-22

## 2025-03-31 NOTE — ED ADULT NURSE NOTE - NS ED NOTE  TALK SOMEONE YN
Mahesh Tao, TORY  [facility] [place] [facility]33 minutes ago (1:06 PM)       This is simply not a clean-catch urine.  It does not require any type of change to presentation or treatment options.  It is meant as an  explanation as to why she has some slight abnormalities in her urine stream.  No infectious process is suspected based on what I can see.     
No